# Patient Record
Sex: MALE | Race: BLACK OR AFRICAN AMERICAN | NOT HISPANIC OR LATINO | Employment: OTHER | URBAN - METROPOLITAN AREA
[De-identification: names, ages, dates, MRNs, and addresses within clinical notes are randomized per-mention and may not be internally consistent; named-entity substitution may affect disease eponyms.]

---

## 2017-12-02 ENCOUNTER — HOSPITAL ENCOUNTER (EMERGENCY)
Facility: HOSPITAL | Age: 32
Discharge: HOME | End: 2017-12-02
Attending: EMERGENCY MEDICINE

## 2017-12-02 VITALS
DIASTOLIC BLOOD PRESSURE: 90 MMHG | BODY MASS INDEX: 22.19 KG/M2 | SYSTOLIC BLOOD PRESSURE: 148 MMHG | OXYGEN SATURATION: 100 % | RESPIRATION RATE: 18 BRPM | HEIGHT: 70 IN | WEIGHT: 155 LBS | HEART RATE: 83 BPM

## 2017-12-02 DIAGNOSIS — K04.7 DENTAL ABSCESS: ICD-10-CM

## 2017-12-02 DIAGNOSIS — R56.9 SEIZURE: Primary | ICD-10-CM

## 2017-12-02 DIAGNOSIS — F19.10 DRUG ABUSE: ICD-10-CM

## 2017-12-02 LAB
ALBUMIN SERPL BCP-MCNC: 4.4 G/DL
ALP SERPL-CCNC: 93 U/L
ALT SERPL W/O P-5'-P-CCNC: 12 U/L
AMORPH CRY UR QL COMP ASSIST: NORMAL
AMPHET+METHAMPHET UR QL: NORMAL
ANION GAP SERPL CALC-SCNC: 19 MMOL/L
AST SERPL-CCNC: 25 U/L
BACTERIA #/AREA URNS AUTO: NORMAL /HPF
BARBITURATES UR QL SCN>200 NG/ML: NEGATIVE
BASOPHILS # BLD AUTO: 0.05 K/UL
BASOPHILS NFR BLD: 0.4 %
BENZODIAZ UR QL SCN>200 NG/ML: NORMAL
BILIRUB SERPL-MCNC: 0.3 MG/DL
BILIRUB UR QL STRIP: NEGATIVE
BUN SERPL-MCNC: 9 MG/DL
BZE UR QL SCN: NEGATIVE
CALCIUM SERPL-MCNC: 9.8 MG/DL
CANNABINOIDS UR QL SCN: NORMAL
CHLORIDE SERPL-SCNC: 100 MMOL/L
CLARITY UR REFRACT.AUTO: ABNORMAL
CO2 SERPL-SCNC: 20 MMOL/L
COLOR UR AUTO: YELLOW
CREAT SERPL-MCNC: 1.2 MG/DL
CREAT UR-MCNC: 38 MG/DL
DIFFERENTIAL METHOD: ABNORMAL
EOSINOPHIL # BLD AUTO: 0.1 K/UL
EOSINOPHIL NFR BLD: 0.5 %
ERYTHROCYTE [DISTWIDTH] IN BLOOD BY AUTOMATED COUNT: 13.1 %
EST. GFR  (AFRICAN AMERICAN): >60 ML/MIN/1.73 M^2
EST. GFR  (NON AFRICAN AMERICAN): >60 ML/MIN/1.73 M^2
ETHANOL SERPL-MCNC: <10 MG/DL
GLUCOSE SERPL-MCNC: 55 MG/DL
GLUCOSE UR QL STRIP: NEGATIVE
HCT VFR BLD AUTO: 40.9 %
HGB BLD-MCNC: 13.1 G/DL
HGB UR QL STRIP: ABNORMAL
HYALINE CASTS UR QL AUTO: 0 /LPF
IMM GRANULOCYTES # BLD AUTO: 0.04 K/UL
IMM GRANULOCYTES NFR BLD AUTO: 0.3 %
KETONES UR QL STRIP: NEGATIVE
LEUKOCYTE ESTERASE UR QL STRIP: NEGATIVE
LYMPHOCYTES # BLD AUTO: 2.4 K/UL
LYMPHOCYTES NFR BLD: 18.9 %
MAGNESIUM SERPL-MCNC: 2.6 MG/DL
MCH RBC QN AUTO: 28.8 PG
MCHC RBC AUTO-ENTMCNC: 32 G/DL
MCV RBC AUTO: 90 FL
METHADONE UR QL SCN>300 NG/ML: NEGATIVE
MICROSCOPIC COMMENT: NORMAL
MONOCYTES # BLD AUTO: 1.2 K/UL
MONOCYTES NFR BLD: 9.6 %
NEUTROPHILS # BLD AUTO: 9 K/UL
NEUTROPHILS NFR BLD: 70.3 %
NITRITE UR QL STRIP: NEGATIVE
NRBC BLD-RTO: 0 /100 WBC
OPIATES UR QL SCN: NEGATIVE
PCP UR QL SCN>25 NG/ML: NEGATIVE
PH UR STRIP: 6 [PH] (ref 5–8)
PLATELET # BLD AUTO: 264 K/UL
PMV BLD AUTO: 9.6 FL
POTASSIUM SERPL-SCNC: 3.6 MMOL/L
PROT SERPL-MCNC: 8.2 G/DL
PROT UR QL STRIP: ABNORMAL
RBC # BLD AUTO: 4.55 M/UL
RBC #/AREA URNS AUTO: 1 /HPF (ref 0–4)
SODIUM SERPL-SCNC: 139 MMOL/L
SP GR UR STRIP: 1.01 (ref 1–1.03)
TOXICOLOGY INFORMATION: NORMAL
URN SPEC COLLECT METH UR: ABNORMAL
UROBILINOGEN UR STRIP-ACNC: NEGATIVE EU/DL
WBC # BLD AUTO: 12.85 K/UL
WBC #/AREA URNS AUTO: 1 /HPF (ref 0–5)

## 2017-12-02 PROCEDURE — 80053 COMPREHEN METABOLIC PANEL: CPT

## 2017-12-02 PROCEDURE — 93010 ELECTROCARDIOGRAM REPORT: CPT | Mod: ,,, | Performed by: INTERNAL MEDICINE

## 2017-12-02 PROCEDURE — 99285 EMERGENCY DEPT VISIT HI MDM: CPT | Mod: 25

## 2017-12-02 PROCEDURE — 85025 COMPLETE CBC W/AUTO DIFF WBC: CPT

## 2017-12-02 PROCEDURE — 80307 DRUG TEST PRSMV CHEM ANLYZR: CPT

## 2017-12-02 PROCEDURE — 93005 ELECTROCARDIOGRAM TRACING: CPT

## 2017-12-02 PROCEDURE — 83735 ASSAY OF MAGNESIUM: CPT

## 2017-12-02 PROCEDURE — 99285 EMERGENCY DEPT VISIT HI MDM: CPT | Mod: ,,,

## 2017-12-02 PROCEDURE — 80320 DRUG SCREEN QUANTALCOHOLS: CPT

## 2017-12-02 PROCEDURE — 25000003 PHARM REV CODE 250

## 2017-12-02 PROCEDURE — 96365 THER/PROPH/DIAG IV INF INIT: CPT

## 2017-12-02 PROCEDURE — S0077 INJECTION, CLINDAMYCIN PHOSP: HCPCS

## 2017-12-02 PROCEDURE — 81001 URINALYSIS AUTO W/SCOPE: CPT

## 2017-12-02 RX ORDER — CLINDAMYCIN PHOSPHATE 900 MG/50ML
900 INJECTION, SOLUTION INTRAVENOUS
Status: COMPLETED | OUTPATIENT
Start: 2017-12-02 | End: 2017-12-02

## 2017-12-02 RX ORDER — KETOROLAC TROMETHAMINE 10 MG/1
10 TABLET, FILM COATED ORAL EVERY 6 HOURS
Qty: 20 TABLET | Refills: 0 | Status: ON HOLD | OUTPATIENT
Start: 2017-12-02 | End: 2018-12-19 | Stop reason: HOSPADM

## 2017-12-02 RX ORDER — CLINDAMYCIN HYDROCHLORIDE 150 MG/1
300 CAPSULE ORAL 4 TIMES DAILY
Qty: 56 CAPSULE | Refills: 0 | Status: SHIPPED | OUTPATIENT
Start: 2017-12-02 | End: 2017-12-09

## 2017-12-02 RX ADMIN — CLINDAMYCIN IN 5 PERCENT DEXTROSE 900 MG: 18 INJECTION, SOLUTION INTRAVENOUS at 10:12

## 2017-12-03 NOTE — ED NOTES
Charge nurse obtained cash from EMS that was on pt's person in the amount of $1230, put cash in envelope and escorted by security to secure cash. No other belongings obtained.

## 2017-12-03 NOTE — ED TRIAGE NOTES
"Ej Marte, a 31 y.o. male presents to the ED bed 2      Chief Complaint   Patient presents with    Drug Overdose     pt arrived by  EMS. pt was driving when he had a witnessed seizure. pt's family stopped the car. pt had a witnessed seizure in front of [place]. pt had what police called "green stuff" which they believe is MOJO in the car    Seizures     Pt became combative with EMS and they gave pt 5mg versed IV PTA. Pt lightly sedated.        Review of patient's allergies indicates:  Allergies not on file  No past medical history on file.  "

## 2017-12-03 NOTE — ED PROVIDER NOTES
"Encounter Date: 12/2/2017    SCRIBE #1 NOTE: I, Shabnam Phipps, am scribing for, and in the presence of,  Dr. Mehta. I have scribed the following portions of the note - the EKG reading.       History     Chief Complaint   Patient presents with    Drug Overdose     pt arrived by  EMS. pt was driving when he had a witnessed seizure. pt's family stopped the car. pt had a witnessed seizure in front of [facility]. pt had what police called "green stuff" which they believe is MOJO in the car    Seizures     31-year-old male with medical history of seizure presents to the ED with a seizure secondary to possible drug overdose.  Patient arrived via EMS.  EMS reports patient was driving when seizure began and his 10-year-old daughter pulled over the car.  Seizure lasted at least 2 minutes.  Police noted a green bag  in the car which is they believe to be mojo.  Patient's mother contacted and informed EMS that patient had a seizure one year ago that was possibly caused from hypoglycemia.  Patient was not placed on seizure medication.  EMS states patient became postictal and had bizarre behavior so he was given Versed 5mg in the field and has been sleeping since.  EMS reports patient was diaphoretic and possibly soiled himself. EMS denies signs of trauma to head and other areas of body.          Review of patient's allergies indicates:  Allergies not on file  History reviewed. No pertinent past medical history.  History reviewed. No pertinent surgical history.  History reviewed. No pertinent family history.  Social History   Substance Use Topics    Smoking status: Smoker, Current Status Unknown    Smokeless tobacco: Not on file    Alcohol use Not on file     Review of Systems   Constitutional: Negative for diaphoresis, fatigue and fever.   HENT: Positive for dental problem and facial swelling. Negative for congestion and sore throat.    Eyes: Negative for visual disturbance.   Respiratory: Negative for cough and shortness " of breath.    Cardiovascular: Negative for chest pain and leg swelling.   Gastrointestinal: Negative for abdominal distention, nausea and vomiting.   Genitourinary: Negative for dysuria, flank pain and hematuria.   Musculoskeletal: Negative for back pain and neck pain.   Skin: Negative for rash.   Neurological: Positive for seizures. Negative for syncope, weakness and headaches.   Psychiatric/Behavioral: The patient is not nervous/anxious.        Physical Exam     Initial Vitals [12/02/17 1956]   BP Pulse Resp Temp SpO2   (!) 142/74 110 16 -- 98 %      MAP       96.67         Physical Exam    Vitals reviewed.  Constitutional: Vital signs are normal. He appears well-developed and well-nourished. He is diaphoretic. He is sleeping. No distress.   Patient received versed 5mg in field and currently sleeping   HENT:   Head: Normocephalic and atraumatic.       Nose: Nose normal.   Mouth/Throat: Uvula is midline and oropharynx is clear and moist. Dental abscesses present.   Swelling noted to right lower jaw   Eyes: Conjunctivae and EOM are normal. Pupils are equal, round, and reactive to light. Right eye exhibits no discharge. Left eye exhibits no discharge. Pupils are equal.   Bilateral pinpoint pupils   Neck: Normal range of motion. Neck supple. No thyromegaly present.   Cardiovascular: Normal rate and regular rhythm.   Pulmonary/Chest: Breath sounds normal. No respiratory distress. He has no wheezes. He exhibits no tenderness.   Abdominal: Soft. Bowel sounds are normal. He exhibits no distension. There is no tenderness. There is no rebound.   Musculoskeletal: Normal range of motion.   Lymphadenopathy:     He has no cervical adenopathy.   Neurological: He is oriented to person, place, and time. He has normal strength. No cranial nerve deficit or sensory deficit.   Skin: Skin is warm. Capillary refill takes less than 2 seconds. No rash noted.   Psychiatric: He has a normal mood and affect.         ED Course    Procedures  Labs Reviewed   CBC W/ AUTO DIFFERENTIAL - Abnormal; Notable for the following:        Result Value    WBC 12.85 (*)     RBC 4.55 (*)     Hemoglobin 13.1 (*)     Gran # 9.0 (*)     Mono # 1.2 (*)     All other components within normal limits   COMPREHENSIVE METABOLIC PANEL - Abnormal; Notable for the following:     CO2 20 (*)     Glucose 55 (*)     Anion Gap 19 (*)     All other components within normal limits   URINALYSIS, REFLEX TO URINE CULTURE - Abnormal; Notable for the following:     Appearance, UA Hazy (*)     Protein, UA 1+ (*)     Occult Blood UA 1+ (*)     All other components within normal limits    Narrative:     Preferred Collection Type->Urine, Clean Catch   DRUG SCREEN PANEL, URINE EMERGENCY    Narrative:     Preferred Collection Type->Urine, Clean Catch   ALCOHOL,MEDICAL (ETHANOL)   MAGNESIUM   URINALYSIS MICROSCOPIC    Narrative:     Preferred Collection Type->Urine, Clean Catch     EKG Readings: (Independently Interpreted)   Heart rate 93. Normal sinus rhythm. LVH     Imaging Results          CT Head Without Contrast (Final result)  Result time 12/02/17 23:09:08    Final result by Benji Nelson MD (12/02/17 23:09:08)                 Impression:       Noncontrast CT demonstrates no evidence of acute intracranial pathology.  ______________________________________     Electronically signed by resident: ASHA BRADLEY  Date:     12/02/17  Time:    22:43            As the supervising and teaching physician, I personally reviewed the images and resident's interpretation and I agree with the findings.          Electronically signed by: BENJI NELSON MD  Date:     12/02/17  Time:    23:09              Narrative:    CT head without contrast    CLINICAL INDICATION: seizure    TECHNIQUE: Axial CT images obtained throughout the region of the head without the use of intravenous contrast. Axial, sagittal and coronal reconstructions were performed.    COMPARISON: No available dedicated  priors    FINDINGS:  The craniocervical junction is within normal limits.  The midline structures are unremarkable.  No dense vessel sign is identified.  The gray-white differentiation is maintained.  The ventricles are normal in size without evidence of hydrocephalus.The brain appears within normal limits. No parenchymal mass, hemorrhage, edema or major vascular distribution infarct.No extra-axial blood or fluid collections.    The cranium appears intact. The reported dental abscess is not imaged on this examination. There is a small metallic density, possibly a BB, adjacent to the extracranial aspect of the left superior posterior parietal bone Mastoid air cells and paranasal sinuses are essentially clear.                                 Medical Decision Making:   History:   Old Medical Records: I decided to obtain old medical records.  Old Records Summarized: records from clinic visits.  Independently Interpreted Test(s):   I have ordered and independently interpreted EKG Reading(s) - see prior notes  Clinical Tests:   Lab Tests: Ordered and Reviewed  Radiological Study: Ordered and Reviewed  Medical Tests: Ordered and Reviewed       APC / Resident Notes:   31-year-old male with medical history of seizure presents to the ED with a seizure secondary to possible drug overdose.    DDX includes but is not limited to seizure, drug overdose, etoh intoxication, electrolyte abnormality. Will get labs, EKG and await for patient to be clinically sober.    9:05 PM  Patient now AAOx3 and cooperative. Unaware of what happened, only complaint is dental abscess x 2 days and denies drug or alcohol use. Drug screen positives include benzodiazepines, amphetamines and marijuana. All other labs benign. Will give IV clinda 900mg for dental abscess. Patient is clinically sober. Able to ambulate on own.CT head benign. Discharged to home in stable condition, return to ED warnings given, follow up and patient care instructions  given.  Patient discharge home with clinda 300mg QID x 7 days.    I have discussed and reviewed with my supervising physician.       Scribe Attestation:   Scribe #1: I performed the above scribed service and the documentation accurately describes the services I performed. I attest to the accuracy of the note.    Attending Attestation:     Physician Attestation Statement for NP/PA:   I discussed this assessment and plan of this patient with the NP/PA, but I did not personally examine the patient. The face to face encounter was performed by the NP/PA.                  ED Course      Clinical Impression:   The primary encounter diagnosis was Seizure. Diagnoses of Drug abuse and Dental abscess were also pertinent to this visit.    Disposition:   Disposition: Discharged  Condition: Stable                        Lay Ceja PA-C  12/03/17 0154       Jesus Mehta MD  12/04/17 1249

## 2017-12-03 NOTE — ED TRIAGE NOTES
Pt brought in by EMS for a drug overdose, assumed to be MOJO, which he was reportedly smoking in the car while driving with his two children. Pt's daughter reportedly was able to get the car stopped, pt had a witnessed seizure lasting approximately 2 minutes. According to EMS pt was uncooperative and extremely diaphoretic, given 5mg of versed en route. Pt is currently still diaphoretic, only responsive to painful stimuli, will open his eyes but won't respond verbally. VSS, respirations even and unlabored, no distress noted.

## 2017-12-03 NOTE — ED NOTES
"Pt now awake, alert and oriented x 4, says he remembers driving his car earlier and "I felt a jerk" and then reports LOC. Next thing he remembers is police being everywhere, some of the ambulance ride. Pt denies smoking MOJO, reports all he does is smoke marijuana even when given drug screen results. Swelling noted to right side face, reports he began having tooth and mouth pain a few days ago. No distress noted. Physician informed of assessment. Family at bedside, will continue to monitor.   "

## 2017-12-03 NOTE — ED NOTES
Sealed envelope retrieved with security by charge nurse containing pt's cash in the amount of $1230 and returned to patient.

## 2018-12-14 ENCOUNTER — HOSPITAL ENCOUNTER (EMERGENCY)
Facility: HOSPITAL | Age: 33
Discharge: PSYCHIATRIC OR SUBSTANCE USE FACILITY | End: 2018-12-15
Attending: FAMILY MEDICINE
Payer: MEDICAID

## 2018-12-14 DIAGNOSIS — F20.9 SCHIZOPHRENIA, UNSPECIFIED TYPE: Primary | ICD-10-CM

## 2018-12-14 DIAGNOSIS — R94.31 ABNORMAL EKG: ICD-10-CM

## 2018-12-14 LAB
ALBUMIN SERPL BCP-MCNC: 5.5 G/DL
ALP SERPL-CCNC: 81 U/L
ALT SERPL W/O P-5'-P-CCNC: 16 U/L
AMPHET+METHAMPHET UR QL: NORMAL
ANION GAP SERPL CALC-SCNC: 8 MMOL/L
APAP SERPL-MCNC: <10 UG/ML
AST SERPL-CCNC: 27 U/L
BARBITURATES UR QL SCN>200 NG/ML: NEGATIVE
BASOPHILS # BLD AUTO: 0.05 K/UL
BASOPHILS NFR BLD: 0.8 %
BENZODIAZ UR QL SCN>200 NG/ML: NEGATIVE
BILIRUB SERPL-MCNC: 0.7 MG/DL
BILIRUB UR QL STRIP: NEGATIVE
BUN SERPL-MCNC: 13 MG/DL
BZE UR QL SCN: NEGATIVE
CALCIUM SERPL-MCNC: 10.5 MG/DL
CANNABINOIDS UR QL SCN: NORMAL
CHLORIDE SERPL-SCNC: 101 MMOL/L
CLARITY UR REFRACT.AUTO: CLEAR
CO2 SERPL-SCNC: 32 MMOL/L
COLOR UR AUTO: YELLOW
CREAT SERPL-MCNC: 1.1 MG/DL
CREAT UR-MCNC: 138.9 MG/DL
DIFFERENTIAL METHOD: NORMAL
EOSINOPHIL # BLD AUTO: 0.1 K/UL
EOSINOPHIL NFR BLD: 1.5 %
ERYTHROCYTE [DISTWIDTH] IN BLOOD BY AUTOMATED COUNT: 14.5 %
EST. GFR  (AFRICAN AMERICAN): >60 ML/MIN/1.73 M^2
EST. GFR  (NON AFRICAN AMERICAN): >60 ML/MIN/1.73 M^2
ETHANOL SERPL-MCNC: <10 MG/DL
GLUCOSE SERPL-MCNC: 81 MG/DL
GLUCOSE UR QL STRIP: NEGATIVE
HCT VFR BLD AUTO: 49.5 %
HGB BLD-MCNC: 16 G/DL
HGB UR QL STRIP: NEGATIVE
KETONES UR QL STRIP: NEGATIVE
LEUKOCYTE ESTERASE UR QL STRIP: NEGATIVE
LYMPHOCYTES # BLD AUTO: 2 K/UL
LYMPHOCYTES NFR BLD: 33.1 %
MCH RBC QN AUTO: 29.7 PG
MCHC RBC AUTO-ENTMCNC: 32.3 G/DL
MCV RBC AUTO: 92 FL
METHADONE UR QL SCN>300 NG/ML: NEGATIVE
MONOCYTES # BLD AUTO: 0.7 K/UL
MONOCYTES NFR BLD: 11.1 %
NEUTROPHILS # BLD AUTO: 3.2 K/UL
NEUTROPHILS NFR BLD: 53.3 %
NITRITE UR QL STRIP: NEGATIVE
OPIATES UR QL SCN: NEGATIVE
PCP UR QL SCN>25 NG/ML: NEGATIVE
PH UR STRIP: 7 [PH] (ref 5–8)
PLATELET # BLD AUTO: 276 K/UL
PMV BLD AUTO: 10.1 FL
POTASSIUM SERPL-SCNC: 4.2 MMOL/L
PROT SERPL-MCNC: 9.7 G/DL
PROT UR QL STRIP: NEGATIVE
RBC # BLD AUTO: 5.38 M/UL
SODIUM SERPL-SCNC: 141 MMOL/L
SP GR UR STRIP: 1 (ref 1–1.03)
TOXICOLOGY INFORMATION: NORMAL
URN SPEC COLLECT METH UR: NORMAL
UROBILINOGEN UR STRIP-ACNC: NEGATIVE EU/DL
WBC # BLD AUTO: 5.95 K/UL

## 2018-12-14 PROCEDURE — 85025 COMPLETE CBC W/AUTO DIFF WBC: CPT

## 2018-12-14 PROCEDURE — 80320 DRUG SCREEN QUANTALCOHOLS: CPT

## 2018-12-14 PROCEDURE — 81003 URINALYSIS AUTO W/O SCOPE: CPT | Mod: 59

## 2018-12-14 PROCEDURE — 80053 COMPREHEN METABOLIC PANEL: CPT

## 2018-12-14 PROCEDURE — 80307 DRUG TEST PRSMV CHEM ANLYZR: CPT

## 2018-12-14 PROCEDURE — 93005 ELECTROCARDIOGRAM TRACING: CPT

## 2018-12-14 PROCEDURE — 99285 EMERGENCY DEPT VISIT HI MDM: CPT | Mod: 25

## 2018-12-14 PROCEDURE — 80329 ANALGESICS NON-OPIOID 1 OR 2: CPT

## 2018-12-14 PROCEDURE — G0425 INPT/ED TELECONSULT30: HCPCS | Mod: AF,HB,, | Performed by: PSYCHIATRY & NEUROLOGY

## 2018-12-14 PROCEDURE — 93010 ELECTROCARDIOGRAM REPORT: CPT | Mod: ,,, | Performed by: INTERNAL MEDICINE

## 2018-12-14 NOTE — ED NOTES
Pt is resting in stretcher, in no acute distress, respirations even and non labored. PEC precautions maintained. Sitter at bedside for direct observation. Will continue to monitor.

## 2018-12-14 NOTE — ED NOTES
Called ED to notify that PEC was received; awaiting medical clearance to go forward with placement process.

## 2018-12-14 NOTE — ED NOTES
Tia from [facility] called, states Dr Salgado on call until 1200 but has been unable to contact him.  States Dr Jorge goes on call at 1300 and if she is unable to get in touch with Dr. Salgado will contact Dr Jorge at that point.  States no call from [phone].

## 2018-12-14 NOTE — ED NOTES
Waiting for tele pysch consult. Machine in room. Pt is calm/coopeartive. Sitter at bedside. Will continue to monitor.

## 2018-12-14 NOTE — ED NOTES
Spoke with Sunil at [facility] to inform him of packet faxed over for placement. Will call me back after review for intake placement.

## 2018-12-14 NOTE — ED NOTES
"Pt aware of need for urine sample, states "I know I am drinking now so I can get it". Will continue to monitor.   "

## 2018-12-14 NOTE — ED NOTES
Security [facility] with [facility].  Pt calm and cooperative at this time

## 2018-12-14 NOTE — ED NOTES
"Pt mother Tootie Doan called, states biswas was admitted to [facility] for approx 5 days last month.   pt has not taken any medications since being released.   pt has had feelings that people are following him and chasing him.  States "he can barely go to work, he lost his apartment because he thought people were following him."  Mother  he has started making statements like "I'm going to have to take them out before they get me."   pt came to her job and was pointing out some of her co-workers saying "they know something, they are following me."   pt was [place] interstate last pm and said "30-40" of the cars were following him.  Mother  "and his dad has a genetic brain disease, maybe this could be related to that, I don't know."  Mother  is coming to ED to bring pt insurance information.   "

## 2018-12-14 NOTE — CONSULTS
"Tele-Consultation to Emergency Department from Psychiatry    Please see previous notes:    From current presentation:  Patient presents with    Psychiatric Evaluation       Pt to ED per RICK Escalante with OPC in place.  States pt with paranoia, mother reports pt thinks people are out to kill him and he needs to protect self.  States pt was PEC last month and has not filled rx and not taking any medications for symptoms.    32-year-old male patient with a history of schizophrenia and bipolar comes in after being noncompliant on medicines feeling like several people are trying to kill him.  Patient has extensive paranoia and hallucinations otherwise patient was pec 1 month ago.  Patient was brought in by [place] police department    From 12/04/17:  Patient presents with    Drug Overdose       pt arrived by  EMS. pt was driving when he had a witnessed seizure. pt's family stopped the car. pt had a witnessed seizure in front of [facility]. pt had what police called "green stuff" which they believe is MOJO in the car    Seizures   31-year-old male with medical history of seizure presents to the ED with a seizure secondary to possible drug overdose.  Patient arrived via EMS.  EMS reports patient was driving when seizure began and his 10-year-old daughter pulled over the car.  Seizure lasted at least 2 minutes.  Police noted a green bag  in the car which is they believe to be mojo.  Patient's mother contacted and informed EMS that patient had a seizure one year ago that was possibly caused from hypoglycemia.  Patient was not placed on seizure medication.  EMS states patient became postictal and had bizarre behavior so he was given Versed 5mg in the field and has been sleeping since.  EMS reports patient was diaphoretic and possibly soiled himself. EMS denies signs of trauma to head and other areas of body.        Patient agreeable to consultation via telepsychiatry.    Consultation started: 12/14/2018 at 1:25 pm.  The " "chief complaint leading to psychiatric consultation is: OPC  This consultation was requested by Dr. Bright Steiner, the Emergency Department attending physician.  The location of the consulting psychiatrist is [address] [place] [address].  The patient location is [facility]    Patient Identification:  Ej Marte is a 32 y.o. male.    Patient information was obtained from patient.    History of Present Illness:  "The paper says 100 people are following me"; pt. Repeatedly says that this is not possible.  No medication. No alcohol. Smokes marijuana every few days. Occasionally takes pain pills. Denies mojo.    Pt. Agreeable to me calling mother, Dari, [phone]: about 5 weeks ago felt others were following him, trying to kill him; he ran and hid, jumped fences, reportedly was hiding [place] the [place] in the [place]; was hospitalized psychiatrically at [facility] for 5 days, pt. Subsequently may not have filled prescriptions, after 2 weeks became paranoid, saying that someone paid someone to kill, that people were following; pt.'s father reportedly has a genetic brain disease, father's mother had same illness; pt. Has had a few seizures, most recent about 9 months ago; pt. Recently may have used ecstasy or methamphetamine; mother about 9 months ago was told that pt. Had a small bleed in his brain after having a seizure and falling; yesterday pt. Told mother that he might have to "take out" the people who were trying to have him killed; pt. Has been trying to obtain a gun; nkda    Psychiatric History:   States, that about 6 weeks ago, his girlfriend and her friends were following him, and he saw a psychiatrist at the time.  Hospitalization: denies  Medication Trials: denies  Suicide Attempts: denies  Violence: denies  Depression: denies  Sarah: denies  AH's: denies  Delusions: reportedly has been paranoid    Review of Systems:  Denies any current physical complaint    Past Medical History: " "History reviewed. No pertinent past medical history.     Seizures: yes  Head trauma/l.o.c.: denies head trauma with l.o.c.    Allergies: nkda  Review of patient's allergies indicates:  No Known Allergies    Medications in ER: Medications - No data to display    Legal History:   Past charges/incarcerations: in past incarcerated for a week[traffic tickets]  Pending charges: denies    Family Psychiatric History:   denies    Social History:   History of Physical/Sexual Abuse: denies  Education: 7th grade    Employment/Disability: has [employer]   Financial: adequate resources  Relationship Status/Sexual Orientation: currently not in a relationship   Children: 6   Housing Status: has recently been staying at different places  Restoration: believes in God   History: denies   Recreational Activities: fishing  Access to Gun: denies     Current Evaluation:     Constitutional  Vitals:  Vitals:    12/14/18 0842   BP: (!) 164/104   Pulse: (!) 115   Resp: 20   Temp: 98.5 °F (36.9 °C)   TempSrc: Oral   SpO2: 100%   Weight: 74.8 kg (165 lb)   Height: 6' (1.829 m)      General:  unremarkable, age appropriate     Musculoskeletal  Muscle Strength/Tone:   moving arms normally   Gait & Station:   sitting on stretcher     Psychiatric  Level of Consciousness: alert  Orientation: oriented to person, place and time  Grooming: in hospital gown  Psychomotor Behavior: no agitation  Speech: normal in rate, rhythm and volume  Language: uses words appropriately  Mood: "ok"  Affect: full range  Thought Process: repeatedly focuses on reportedly 100 people following him[says that this is not possible]  Thought Content: denies SI/HI  Memory: grossly intact  Attention: intact to interview  Insight: appears limited  Judgement: appears limited    Relevant Elements of Neurological Exam: no abnormality of posture noted    Assessment - Diagnosis - Goals:     Diagnosis/Impression:   Psychosis, unspecified  R/o substance induced psychosis  R/o psychosis " due to neurological disease[father has and paternal grandmother had serious neurological disease; pt. Has had seizures; 9 months ago mother was told that pt. Had a small bleed in his brain]  Urine tox positive for THC  Abnormal EKG from 12/02/17[please see below]    Case d/w ER MD Dr. Steiner.    Rec:    - consider repeating EKG  - consider imaging of brain  - consider neurology consult  - medical clearance  - PEC   - if medically/neurologically cleared, psychiatric hospitalization  - no standing psychotropic medication for now  - Haldol/Benadryl/Ativan p.o./i.m. Prn for agitation      Time with patient: 15 min    More than 50% of the time was spent counseling/coordinating care    Laboratory Data:   EKG from 12/02/17:  Blood Pressure : 134/076 mmHG  Vent. Rate : 093 BPM     Atrial Rate : 093 BPM     P-R Int : 140 ms          QRS Dur : 098 ms      QT Int : 388 ms       P-R-T Axes : 089 099 065 degrees     QTc Int : 482 ms  Normal sinus rhythm  Biatrial enlargement  Rightward axis  Prolonged QT  Abnormal ECG  No previous ECGs available  Confirmed by TOMAS GAMBOA MD ([id number]) on 12/3/2017 5:28:51 PM  Labs Reviewed   COMPREHENSIVE METABOLIC PANEL - Abnormal; Notable for the following components:       Result Value    CO2 32 (*)     Total Protein 9.7 (*)     Albumin 5.5 (*)     All other components within normal limits   CBC W/ AUTO DIFFERENTIAL   URINALYSIS, REFLEX TO URINE CULTURE    Narrative:     Preferred Collection Type->Urine, Clean Catch   DRUG SCREEN PANEL, URINE EMERGENCY    Narrative:     Preferred Collection Type->Urine, Clean Catch   ALCOHOL,MEDICAL (ETHANOL)   ACETAMINOPHEN LEVEL

## 2018-12-14 NOTE — ED PROVIDER NOTES
Encounter Date: 12/14/2018       History     Chief Complaint   Patient presents with    Psychiatric Evaluation     Pt to ED per RICK Escalante with OPC in place.  States pt with paranoia, mother reports pt thinks people are out to kill him and he needs to protect self.  States pt was PEC last month and has not filled rx and not taking any medications for symptoms.      32-year-old male patient with a history of schizophrenia and bipolar comes in after being noncompliant on medicines feeling like several people are trying to kill him.  Patient has extensive paranoia and hallucinations otherwise patient was pec 1 month ago.  Patient was brought in by [place] police department          Review of patient's allergies indicates:  No Known Allergies  History reviewed. No pertinent past medical history.  History reviewed. No pertinent surgical history.  History reviewed. No pertinent family history.  Social History     Tobacco Use    Smoking status: Smoker, Current Status Unknown   Substance Use Topics    Alcohol use: Not on file    Drug use: Yes     Comment: MOJO, other drugs unknown     Review of Systems   Unable to perform ROS: Psychiatric disorder       Physical Exam     Initial Vitals [12/14/18 0842]   BP Pulse Resp Temp SpO2   (!) 164/104 (!) 115 20 98.5 °F (36.9 °C) 100 %      MAP       --         Physical Exam    Nursing note and vitals reviewed.  Constitutional: He appears well-developed and well-nourished.   HENT:   Head: Normocephalic and atraumatic.   Eyes: Conjunctivae and EOM are normal. Pupils are equal, round, and reactive to light.   Neck: Normal range of motion. Neck supple.   Cardiovascular: Normal rate, regular rhythm and normal heart sounds.   Pulmonary/Chest: Breath sounds normal.   Abdominal: Soft. Bowel sounds are normal.   Musculoskeletal: Normal range of motion.   Neurological: He is alert. He has normal reflexes.   Psychiatric:   Pressured speech with hallucinations and somewhat paranoid          ED Course   Procedures  Labs Reviewed   CBC W/ AUTO DIFFERENTIAL   COMPREHENSIVE METABOLIC PANEL   URINALYSIS, REFLEX TO URINE CULTURE   DRUG SCREEN PANEL, URINE EMERGENCY   ALCOHOL,MEDICAL (ETHANOL)   ACETAMINOPHEN LEVEL          Imaging Results    None          Medical Decision Making:   Initial Assessment:   Patient in moderate distress and no other complains    Differential Diagnosis:   Suicide ideation  Schizophrenia  Depression  anxiety    ED Management:  Patient is medically cleared for inpatient psychiatric treatment                      Clinical Impression:   The encounter diagnosis was Schizophrenia, unspecified type.                             Bright Steiner MD  12/14/18 [id number]

## 2018-12-15 VITALS
WEIGHT: 165 LBS | OXYGEN SATURATION: 99 % | TEMPERATURE: 98 F | DIASTOLIC BLOOD PRESSURE: 55 MMHG | RESPIRATION RATE: 18 BRPM | HEART RATE: 71 BPM | BODY MASS INDEX: 22.35 KG/M2 | SYSTOLIC BLOOD PRESSURE: 112 MMHG | HEIGHT: 72 IN

## 2018-12-15 PROBLEM — F29 PSYCHOSIS: Status: ACTIVE | Noted: 2018-12-15

## 2018-12-15 NOTE — ED NOTES
Late Addendum: Patient medically cleared for admission per Dr. Steiner at 1530. EKG faxed to [facility] per request.

## 2018-12-15 NOTE — ED NOTES
Transport arrived to  patient. VSS, and belongings given to bar. Report on patient given and [facility] made aware of patient's status.

## 2018-12-15 NOTE — ED NOTES
Admit packet faxed to [facility], [facility], [facility], [facility], [facility], [facility], [facility], [facility], [facility], , [facility], [facility], [facility], [facility], [facility], [facility], [facility], [facility], [facility], [facility], [facility], [facility], [facility], [facility], [facility], [facility], [facility],and [facility].  Waiting for response.

## 2018-12-15 NOTE — ED NOTES
CPT placement accepted by Leti at [facility] located at [address] [place] [address] for the service of Dr. Bhavin Valdivia. Please call report in 30 minutes to [phone].

## 2019-01-01 ENCOUNTER — HOSPITAL ENCOUNTER (EMERGENCY)
Facility: HOSPITAL | Age: 34
Discharge: HOME | End: 2019-01-01
Attending: SURGERY
Payer: MEDICAID

## 2019-01-01 VITALS
OXYGEN SATURATION: 100 % | DIASTOLIC BLOOD PRESSURE: 83 MMHG | RESPIRATION RATE: 19 BRPM | TEMPERATURE: 98 F | SYSTOLIC BLOOD PRESSURE: 163 MMHG | HEART RATE: 87 BPM

## 2019-01-01 DIAGNOSIS — F23: Primary | ICD-10-CM

## 2019-01-01 PROCEDURE — 99283 EMERGENCY DEPT VISIT LOW MDM: CPT | Mod: ER

## 2019-01-01 NOTE — DISCHARGE INSTRUCTIONS
Taking medications as recommended which include risperdal and follow-up with mental health counseling
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
## 2019-01-01 NOTE — ED NOTES
Pt reports every time I get mad at my mom they call the  I just got to be calm. Pt AAO times 4. Pt ambulates steady and without difficulty. Pt calm cooperative and plesant. MD okayed pt to leave ED and walk.

## 2019-01-02 ENCOUNTER — HOSPITAL ENCOUNTER (EMERGENCY)
Facility: HOSPITAL | Age: 34
Discharge: HOME | End: 2019-01-02
Attending: EMERGENCY MEDICINE
Payer: MEDICAID

## 2019-01-02 VITALS
TEMPERATURE: 98 F | BODY MASS INDEX: 23.7 KG/M2 | SYSTOLIC BLOOD PRESSURE: 141 MMHG | WEIGHT: 175 LBS | HEART RATE: 88 BPM | RESPIRATION RATE: 18 BRPM | OXYGEN SATURATION: 99 % | DIASTOLIC BLOOD PRESSURE: 79 MMHG | HEIGHT: 72 IN

## 2019-01-02 DIAGNOSIS — F29 PSYCHOSIS, UNSPECIFIED PSYCHOSIS TYPE: Primary | ICD-10-CM

## 2019-01-02 PROCEDURE — 99283 EMERGENCY DEPT VISIT LOW MDM: CPT | Mod: ER

## 2019-01-03 ENCOUNTER — HOSPITAL ENCOUNTER (EMERGENCY)
Facility: HOSPITAL | Age: 34
Discharge: PSYCHIATRIC OR SUBSTANCE USE FACILITY | End: 2019-01-03
Attending: EMERGENCY MEDICINE
Payer: MEDICAID

## 2019-01-03 VITALS
SYSTOLIC BLOOD PRESSURE: 138 MMHG | DIASTOLIC BLOOD PRESSURE: 66 MMHG | TEMPERATURE: 98 F | OXYGEN SATURATION: 98 % | RESPIRATION RATE: 20 BRPM | HEART RATE: 90 BPM

## 2019-01-03 DIAGNOSIS — F23 ACUTE PSYCHOSIS: Primary | ICD-10-CM

## 2019-01-03 DIAGNOSIS — F20.0 PARANOID SCHIZOPHRENIA: ICD-10-CM

## 2019-01-03 LAB
ALBUMIN SERPL BCP-MCNC: 4.2 G/DL
ALP SERPL-CCNC: 65 U/L
ALT SERPL W/O P-5'-P-CCNC: 12 U/L
AMPHET+METHAMPHET UR QL: NORMAL
ANION GAP SERPL CALC-SCNC: 8 MMOL/L
APAP SERPL-MCNC: <10 UG/ML
AST SERPL-CCNC: 21 U/L
BARBITURATES UR QL SCN>200 NG/ML: NEGATIVE
BASOPHILS # BLD AUTO: 0.03 K/UL
BASOPHILS NFR BLD: 0.5 %
BENZODIAZ UR QL SCN>200 NG/ML: NEGATIVE
BILIRUB SERPL-MCNC: 0.4 MG/DL
BILIRUB UR QL STRIP: NEGATIVE
BUN SERPL-MCNC: 9 MG/DL
BZE UR QL SCN: NEGATIVE
CALCIUM SERPL-MCNC: 9.4 MG/DL
CANNABINOIDS UR QL SCN: NEGATIVE
CHLORIDE SERPL-SCNC: 103 MMOL/L
CLARITY UR REFRACT.AUTO: CLEAR
CO2 SERPL-SCNC: 27 MMOL/L
COLOR UR AUTO: YELLOW
CREAT SERPL-MCNC: 0.93 MG/DL
CREAT UR-MCNC: 53.3 MG/DL
DIFFERENTIAL METHOD: NORMAL
EOSINOPHIL # BLD AUTO: 0.1 K/UL
EOSINOPHIL NFR BLD: 1.5 %
ERYTHROCYTE [DISTWIDTH] IN BLOOD BY AUTOMATED COUNT: 13.4 %
EST. GFR  (AFRICAN AMERICAN): >60 ML/MIN/1.73 M^2
EST. GFR  (NON AFRICAN AMERICAN): >60 ML/MIN/1.73 M^2
ETHANOL SERPL-MCNC: <10 MG/DL
GLUCOSE SERPL-MCNC: 95 MG/DL
GLUCOSE UR QL STRIP: NEGATIVE
HCT VFR BLD AUTO: 43.3 %
HGB BLD-MCNC: 14.1 G/DL
HGB UR QL STRIP: NEGATIVE
KETONES UR QL STRIP: NEGATIVE
LEUKOCYTE ESTERASE UR QL STRIP: NEGATIVE
LYMPHOCYTES # BLD AUTO: 1.6 K/UL
LYMPHOCYTES NFR BLD: 26.4 %
MCH RBC QN AUTO: 29.8 PG
MCHC RBC AUTO-ENTMCNC: 32.6 G/DL
MCV RBC AUTO: 92 FL
METHADONE UR QL SCN>300 NG/ML: NEGATIVE
MONOCYTES # BLD AUTO: 0.6 K/UL
MONOCYTES NFR BLD: 9.1 %
NEUTROPHILS # BLD AUTO: 3.8 K/UL
NEUTROPHILS NFR BLD: 62.3 %
NITRITE UR QL STRIP: NEGATIVE
OPIATES UR QL SCN: NEGATIVE
PCP UR QL SCN>25 NG/ML: NEGATIVE
PH UR STRIP: 7 [PH] (ref 5–8)
PLATELET # BLD AUTO: 245 K/UL
PMV BLD AUTO: 9.9 FL
POTASSIUM SERPL-SCNC: 3.9 MMOL/L
PROT SERPL-MCNC: 7.2 G/DL
PROT UR QL STRIP: NEGATIVE
RBC # BLD AUTO: 4.73 M/UL
SODIUM SERPL-SCNC: 138 MMOL/L
SP GR UR STRIP: 1 (ref 1–1.03)
TOXICOLOGY INFORMATION: NORMAL
URN SPEC COLLECT METH UR: NORMAL
UROBILINOGEN UR STRIP-ACNC: NEGATIVE EU/DL
WBC # BLD AUTO: 6.05 K/UL

## 2019-01-03 PROCEDURE — 80307 DRUG TEST PRSMV CHEM ANLYZR: CPT | Mod: ER

## 2019-01-03 PROCEDURE — 99285 EMERGENCY DEPT VISIT HI MDM: CPT | Mod: ER

## 2019-01-03 PROCEDURE — 85025 COMPLETE CBC W/AUTO DIFF WBC: CPT | Mod: ER

## 2019-01-03 PROCEDURE — G0426 PR INPT TELEHEALTH CONSULT 50M: ICD-10-PCS | Mod: AF,HB,, | Performed by: PSYCHIATRY & NEUROLOGY

## 2019-01-03 PROCEDURE — G0426 INPT/ED TELECONSULT50: HCPCS | Mod: AF,HB,, | Performed by: PSYCHIATRY & NEUROLOGY

## 2019-01-03 PROCEDURE — 80053 COMPREHEN METABOLIC PANEL: CPT | Mod: ER

## 2019-01-03 PROCEDURE — 81003 URINALYSIS AUTO W/O SCOPE: CPT | Mod: ER,59

## 2019-01-03 PROCEDURE — 80329 ANALGESICS NON-OPIOID 1 OR 2: CPT | Mod: ER

## 2019-01-03 PROCEDURE — 80320 DRUG SCREEN QUANTALCOHOLS: CPT | Mod: ER

## 2019-01-03 NOTE — ED NOTES
Pt brought in per police on an OPC. Pt awake and alert. Pt calm and cooperative. Pt reports he is not having visual or auditory hallucinations. Pt denies SI/HI. Pt awaiting MD brown. Sitter at the bedside, bed low and locked, Sr up x1, will ctm.
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
## 2019-01-03 NOTE — ED NOTES
Dr. Lipscomb called requesting nurse or charge nurse listen to a recording pt mother has on him saying psychic things. The return a call to her to discuss.

## 2019-01-03 NOTE — ED NOTES
"Pt has repeatedly refused lab draw and urine specimen. "I did that already and the psychiatrist let me go. I read the law, y'all can't keep doin' this". He is remaining calm, but insists he will not give blood/urine   "

## 2019-01-03 NOTE — ED NOTES
Called [facility] again to ask about delay in tele psych, and they will be calling Dr Lipscomb again. Spoke with Gege
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
## 2019-01-03 NOTE — ED NOTES
Belongings  Black and neon yellow high tops athletic shoes/Nike  Relic watch / silver color with black rubber watch band  White cell phone with multiple cracks and missing pieces  Black long sleeve hoodie sweat shirt  Black sweat pants  Bagged in one bag/labeled and placed in locker

## 2019-01-03 NOTE — ED NOTES
Tele psych consult requested . Spoke with Jocleyn and she is training Makayla. Will call Dr Lipscomb

## 2019-01-03 NOTE — ED NOTES
Still waiting for MD to fill out the PEC. Will fax to [facility] as soon as it is provided to me
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
## 2019-01-04 PROBLEM — R56.9 SEIZURE: Status: ACTIVE | Noted: 2019-01-04

## 2019-01-04 NOTE — ED NOTES
Patient accepted by Gail at [facility] ([address] [place] [address]) for the service of Dr. Valdivia.  Report to be called to [phone].

## 2019-01-04 NOTE — ED NOTES
PEC received in CPT. Faxed clinical packet to [facility], [facility], [facility], & [facility]. Awaiting placement.

## 2019-01-04 NOTE — ED NOTES
Pt resting on stretcher, nadn, resp e/u. Pt calm and cooperative. Pt provided with something to eat and drink. Pt voiced no additional needs at this time. Miri Colindres at pt bedside.

## 2019-01-05 PROBLEM — F20.89 OTHER SCHIZOPHRENIA: Chronic | Status: ACTIVE | Noted: 2018-12-15

## 2019-08-21 ENCOUNTER — HOSPITAL ENCOUNTER (EMERGENCY)
Facility: HOSPITAL | Age: 34
Discharge: PSYCHIATRIC OR SUBSTANCE USE FACILITY | End: 2019-08-22
Attending: EMERGENCY MEDICINE
Payer: MEDICAID

## 2019-08-21 DIAGNOSIS — F29 ACUTE EXACERBATION OF PSYCHOSIS: Primary | ICD-10-CM

## 2019-08-21 DIAGNOSIS — F20.0 PARANOID SCHIZOPHRENIA: ICD-10-CM

## 2019-08-21 LAB
ALBUMIN SERPL BCP-MCNC: 4.6 G/DL (ref 3.5–5.2)
ALP SERPL-CCNC: 89 U/L (ref 38–126)
ALT SERPL W/O P-5'-P-CCNC: 12 U/L (ref 10–44)
ANION GAP SERPL CALC-SCNC: 6 MMOL/L (ref 8–16)
APAP SERPL-MCNC: <10 UG/ML (ref 10–20)
AST SERPL-CCNC: 22 U/L (ref 15–46)
BASOPHILS # BLD AUTO: 0.05 K/UL (ref 0–0.2)
BASOPHILS NFR BLD: 0.8 % (ref 0–1.9)
BILIRUB SERPL-MCNC: 0.8 MG/DL (ref 0.1–1)
BUN SERPL-MCNC: 10 MG/DL (ref 2–20)
CALCIUM SERPL-MCNC: 9.6 MG/DL (ref 8.7–10.5)
CHLORIDE SERPL-SCNC: 107 MMOL/L (ref 95–110)
CO2 SERPL-SCNC: 29 MMOL/L (ref 23–29)
CREAT SERPL-MCNC: 1.01 MG/DL (ref 0.5–1.4)
DIFFERENTIAL METHOD: NORMAL
EOSINOPHIL # BLD AUTO: 0.1 K/UL (ref 0–0.5)
EOSINOPHIL NFR BLD: 1.1 % (ref 0–8)
ERYTHROCYTE [DISTWIDTH] IN BLOOD BY AUTOMATED COUNT: 14.3 % (ref 11.5–14.5)
EST. GFR  (AFRICAN AMERICAN): >60 ML/MIN/1.73 M^2
EST. GFR  (NON AFRICAN AMERICAN): >60 ML/MIN/1.73 M^2
ETHANOL SERPL-MCNC: <10 MG/DL
GLUCOSE SERPL-MCNC: 90 MG/DL (ref 70–110)
HCT VFR BLD AUTO: 44.9 % (ref 40–54)
HGB BLD-MCNC: 14.7 G/DL (ref 14–18)
LYMPHOCYTES # BLD AUTO: 2.5 K/UL (ref 1–4.8)
LYMPHOCYTES NFR BLD: 38.7 % (ref 18–48)
MCH RBC QN AUTO: 29.9 PG (ref 27–31)
MCHC RBC AUTO-ENTMCNC: 32.7 G/DL (ref 32–36)
MCV RBC AUTO: 91 FL (ref 82–98)
MONOCYTES # BLD AUTO: 0.6 K/UL (ref 0.3–1)
MONOCYTES NFR BLD: 9.6 % (ref 4–15)
NEUTROPHILS # BLD AUTO: 3.2 K/UL (ref 1.8–7.7)
NEUTROPHILS NFR BLD: 49.8 % (ref 38–73)
PLATELET # BLD AUTO: 259 K/UL (ref 150–350)
PMV BLD AUTO: 10.1 FL (ref 9.2–12.9)
POTASSIUM SERPL-SCNC: 3.5 MMOL/L (ref 3.5–5.1)
PROT SERPL-MCNC: 7.8 G/DL (ref 6–8.4)
RBC # BLD AUTO: 4.91 M/UL (ref 4.6–6.2)
SODIUM SERPL-SCNC: 142 MMOL/L (ref 136–145)
WBC # BLD AUTO: 6.46 K/UL (ref 3.9–12.7)

## 2019-08-21 PROCEDURE — 99215 OFFICE O/P EST HI 40 MIN: CPT | Mod: 95,AF,HB, | Performed by: PSYCHIATRY & NEUROLOGY

## 2019-08-21 PROCEDURE — 99285 EMERGENCY DEPT VISIT HI MDM: CPT | Mod: 25,ER

## 2019-08-21 PROCEDURE — 99215 PR OFFICE/OUTPT VISIT, EST, LEVL V, 40-54 MIN: ICD-10-PCS | Mod: 95,AF,HB, | Performed by: PSYCHIATRY & NEUROLOGY

## 2019-08-21 PROCEDURE — 85025 COMPLETE CBC W/AUTO DIFF WBC: CPT | Mod: ER

## 2019-08-21 PROCEDURE — 80329 ANALGESICS NON-OPIOID 1 OR 2: CPT | Mod: ER

## 2019-08-21 PROCEDURE — 80320 DRUG SCREEN QUANTALCOHOLS: CPT | Mod: ER

## 2019-08-21 PROCEDURE — 80053 COMPREHEN METABOLIC PANEL: CPT | Mod: ER

## 2019-08-21 PROCEDURE — 63600175 PHARM REV CODE 636 W HCPCS: Mod: ER | Performed by: EMERGENCY MEDICINE

## 2019-08-21 PROCEDURE — 96372 THER/PROPH/DIAG INJ SC/IM: CPT | Mod: ER

## 2019-08-21 RX ORDER — BENZTROPINE MESYLATE 1 MG/1
1 TABLET ORAL EVERY 6 HOURS PRN
Qty: 10 TABLET | Refills: 0 | Status: SHIPPED | OUTPATIENT
Start: 2019-08-21 | End: 2022-08-24 | Stop reason: CLARIF

## 2019-08-21 RX ORDER — DIPHENHYDRAMINE HYDROCHLORIDE 50 MG/ML
50 INJECTION INTRAMUSCULAR; INTRAVENOUS
Status: COMPLETED | OUTPATIENT
Start: 2019-08-21 | End: 2019-08-21

## 2019-08-21 RX ORDER — HALOPERIDOL 5 MG/ML
5 INJECTION INTRAMUSCULAR
Status: COMPLETED | OUTPATIENT
Start: 2019-08-21 | End: 2019-08-21

## 2019-08-21 RX ORDER — RISPERIDONE 3 MG/1
3 TABLET ORAL 2 TIMES DAILY
Qty: 60 TABLET | Refills: 0 | Status: SHIPPED | OUTPATIENT
Start: 2019-08-21 | End: 2023-06-20

## 2019-08-21 RX ORDER — QUETIAPINE FUMARATE 100 MG/1
100 TABLET, FILM COATED ORAL NIGHTLY
Qty: 30 TABLET | Refills: 0 | Status: SHIPPED | OUTPATIENT
Start: 2019-08-21 | End: 2022-07-10 | Stop reason: SDUPTHER

## 2019-08-21 RX ADMIN — DIPHENHYDRAMINE HYDROCHLORIDE 50 MG: 50 INJECTION INTRAMUSCULAR; INTRAVENOUS at 07:08

## 2019-08-21 RX ADMIN — HALOPERIDOL LACTATE 5 MG: 5 INJECTION, SOLUTION INTRAMUSCULAR at 07:08

## 2019-08-22 VITALS
HEART RATE: 100 BPM | TEMPERATURE: 98 F | SYSTOLIC BLOOD PRESSURE: 127 MMHG | HEIGHT: 72 IN | OXYGEN SATURATION: 100 % | DIASTOLIC BLOOD PRESSURE: 79 MMHG | RESPIRATION RATE: 20 BRPM | BODY MASS INDEX: 23.7 KG/M2 | WEIGHT: 175 LBS

## 2019-08-22 LAB
AMPHET+METHAMPHET UR QL: NORMAL
BACTERIA #/AREA URNS AUTO: ABNORMAL /HPF
BARBITURATES UR QL SCN>200 NG/ML: NEGATIVE
BENZODIAZ UR QL SCN>200 NG/ML: NEGATIVE
BILIRUB UR QL STRIP: NEGATIVE
BZE UR QL SCN: NEGATIVE
CANNABINOIDS UR QL SCN: NEGATIVE
CLARITY UR REFRACT.AUTO: CLEAR
COLOR UR AUTO: ABNORMAL
CREAT UR-MCNC: >346.5 MG/DL (ref 23–375)
GLUCOSE UR QL STRIP: NEGATIVE
HGB UR QL STRIP: NEGATIVE
HYALINE CASTS UR QL AUTO: 0 /LPF
KETONES UR QL STRIP: ABNORMAL
LEUKOCYTE ESTERASE UR QL STRIP: ABNORMAL
METHADONE UR QL SCN>300 NG/ML: NEGATIVE
MICROSCOPIC COMMENT: ABNORMAL
NITRITE UR QL STRIP: NEGATIVE
OPIATES UR QL SCN: NEGATIVE
PCP UR QL SCN>25 NG/ML: NEGATIVE
PH UR STRIP: 7 [PH] (ref 5–8)
PROT UR QL STRIP: ABNORMAL
RBC #/AREA URNS AUTO: 6 /HPF (ref 0–4)
SP GR UR STRIP: 1.02 (ref 1–1.03)
SQUAMOUS #/AREA URNS AUTO: ABNORMAL /HPF
TOXICOLOGY INFORMATION: NORMAL
URN SPEC COLLECT METH UR: ABNORMAL
UROBILINOGEN UR STRIP-ACNC: >=8 EU/DL
WBC #/AREA URNS AUTO: 60 /HPF (ref 0–5)
YEAST UR QL AUTO: ABNORMAL

## 2019-08-22 PROCEDURE — 80307 DRUG TEST PRSMV CHEM ANLYZR: CPT | Mod: ER

## 2019-08-22 PROCEDURE — 81000 URINALYSIS NONAUTO W/SCOPE: CPT | Mod: 59,ER

## 2019-08-22 PROCEDURE — 87491 CHLMYD TRACH DNA AMP PROBE: CPT | Mod: ER

## 2019-08-22 PROCEDURE — 87086 URINE CULTURE/COLONY COUNT: CPT | Mod: ER

## 2019-08-22 NOTE — ED NOTES
Dr Ballesteros spoke with pt. Speaking with Dr. Farooq now and does not recommend PEC just to get pt scripts for meds. Informed Dr. Ballesteros that the pt would like her to speak with his mother. Dr. Ballesteros states she does not feel this is necessary.

## 2019-08-22 NOTE — ED NOTES
[facility] called spoke with Marisol. Asked Marisol to please page Dr. Ballesteros so she can speak with pt's mother. Marisol paged Dr. Ballesteros at this time.

## 2019-08-22 NOTE — ED NOTES
Pts PEC faxed to [facility].  's office contacted to inform of PEC status.

## 2019-08-22 NOTE — ED NOTES
Pt unable to urinate and was instructed to drink more of his Powerade. Will try again in 15 minutes.

## 2019-08-22 NOTE — ED NOTES
Dr. Farooq in room to speak with mom at this time. Informed mother that Dr. Ballesteros does not feel pt is in a condition to be PEC'd that she recommends to prescribe his psych meds and start his normal regimen. Mother became frustrated while speaking with Dr. Farooq and left the room. Approached mother down the hallway at which time she agreed to wait and speak with Dr. Ballesteros/psychiatry.

## 2019-08-22 NOTE — ED NOTES
Mother informed on where patient was transferred. [facility] phone information given to mother.

## 2019-08-22 NOTE — ED NOTES
Dr. Ballesteros called back, informed Dr. Ballesteros of what mother has informed myself and Dr. Farooq, and that the mother would like to speak with her. Dr. Ballesteros states she needs to review her notes and Dr. Farooq notes and then she will talk to the mother.

## 2019-08-22 NOTE — NURSING
-Kay states pt's mom currently at facility, asking if Dr Ballesteros could speak with pts mom. I called and left  for Dr Ballesteros.

## 2019-08-22 NOTE — ED PROVIDER NOTES
"Encounter Date: 8/21/2019       History     Chief Complaint   Patient presents with    Psychiatric Evaluation     "I havent been taking my medicines. I need to be admitted." Pt reports that he is "talking out of my head and seeing things following me." +Visual hallucinations. Denies SI/HI.     33-year-old male presents complaining of having a history of schizophrenia and not taking his medicines for over a month.  Patient reports he is very paranoid and feels people are following him and talking about him.  Patient is to paranoid to show up at his own home.  Patient is a parade people are waiting for him to get him.  Patient denies suicidal homicidal ideation.        Review of patient's allergies indicates:  No Known Allergies  Past Medical History:   Diagnosis Date    Bipolar disorder     History of psychiatric hospitalization     Hx of psychiatric care     Psychiatric exam requested by authority     Psychiatric problem     Psychosis     Schizoaffective disorder     Seizures     Substance abuse     Therapy      History reviewed. No pertinent surgical history.  History reviewed. No pertinent family history.  Social History     Tobacco Use    Smoking status: Current Every Day Smoker     Types: Cigarettes    Smokeless tobacco: Never Used   Substance Use Topics    Alcohol use: No     Frequency: Never    Drug use: Yes     Types: Marijuana     Comment: MOJO, other drugs unknown     Review of Systems   Psychiatric/Behavioral: Positive for hallucinations.   All other systems reviewed and are negative.      Physical Exam     Initial Vitals [08/21/19 1911]   BP Pulse Resp Temp SpO2   (!) 160/97 78 20 98.1 °F (36.7 °C) 99 %      MAP       --         Physical Exam    Nursing note and vitals reviewed.  Constitutional: He appears well-developed and well-nourished.   HENT:   Head: Normocephalic and atraumatic.   Right Ear: External ear normal.   Left Ear: External ear normal.   Nose: Nose normal.   Mouth/Throat: " Oropharynx is clear and moist.   Eyes: Conjunctivae and EOM are normal. Pupils are equal, round, and reactive to light.   Neck: Normal range of motion. Neck supple.   Cardiovascular: Normal rate, regular rhythm, normal heart sounds and intact distal pulses.   Pulmonary/Chest: Breath sounds normal.   Abdominal: Soft. Bowel sounds are normal.   Musculoskeletal: Normal range of motion.   Neurological: He is alert and oriented to person, place, and time. He has normal strength. GCS score is 15. GCS eye subscore is 4. GCS verbal subscore is 5. GCS motor subscore is 6.   Skin: Skin is warm. Capillary refill takes less than 2 seconds.         ED Course   Procedures  Labs Reviewed - No data to display      Results for orders placed or performed during the hospital encounter of 08/21/19   CBC auto differential   Result Value Ref Range    WBC 6.46 3.90 - 12.70 K/uL    RBC 4.91 4.60 - 6.20 M/uL    Hemoglobin 14.7 14.0 - 18.0 g/dL    Hematocrit 44.9 40.0 - 54.0 %    Mean Corpuscular Volume 91 82 - 98 fL    Mean Corpuscular Hemoglobin 29.9 27.0 - 31.0 pg    Mean Corpuscular Hemoglobin Conc 32.7 32.0 - 36.0 g/dL    RDW 14.3 11.5 - 14.5 %    Platelets 259 150 - 350 K/uL    MPV 10.1 9.2 - 12.9 fL    Gran # (ANC) 3.2 1.8 - 7.7 K/uL    Lymph # 2.5 1.0 - 4.8 K/uL    Mono # 0.6 0.3 - 1.0 K/uL    Eos # 0.1 0.0 - 0.5 K/uL    Baso # 0.05 0.00 - 0.20 K/uL    Gran% 49.8 38.0 - 73.0 %    Lymph% 38.7 18.0 - 48.0 %    Mono% 9.6 4.0 - 15.0 %    Eosinophil% 1.1 0.0 - 8.0 %    Basophil% 0.8 0.0 - 1.9 %    Differential Method Automated    Comprehensive metabolic panel   Result Value Ref Range    Sodium 142 136 - 145 mmol/L    Potassium 3.5 3.5 - 5.1 mmol/L    Chloride 107 95 - 110 mmol/L    CO2 29 23 - 29 mmol/L    Glucose 90 70 - 110 mg/dL    BUN, Bld 10 2 - 20 mg/dL    Creatinine 1.01 0.50 - 1.40 mg/dL    Calcium 9.6 8.7 - 10.5 mg/dL    Total Protein 7.8 6.0 - 8.4 g/dL    Albumin 4.6 3.5 - 5.2 g/dL    Total Bilirubin 0.8 0.1 - 1.0 mg/dL     Alkaline Phosphatase 89 38 - 126 U/L    AST 22 15 - 46 U/L    ALT 12 10 - 44 U/L    Anion Gap 6 (L) 8 - 16 mmol/L    eGFR if African American >60.0 >60 mL/min/1.73 m^2    eGFR if non African American >60.0 >60 mL/min/1.73 m^2   Ethanol   Result Value Ref Range    Alcohol, Medical, Serum <10 <10 mg/dL   Acetaminophen level   Result Value Ref Range    Acetaminophen (Tylenol), Serum <10.0 10.0 - 20.0 ug/mL         Imaging Results    None          Medical Decision Making:   Clinical Tests:   Lab Tests: Ordered and Reviewed  Other:   I have discussed this case with another health care provider.       <> Summary of the Discussion: Discussed patient with psychiatry on-call who evaluated patient and at 1st did not think patient met criteria for a PEC.  Psychiatrist then talked with patient's mother who convinced psychiatrist that patient may actually be dangerous to himself or other people.  Mother reports patient has made harmful threats while and her presents.  Psychiatrist recommended PEC after all.    Additional MDM:   Psych: A Physician Emergency Certificate (PEC) was done in the ED for: [facility] Dis[facility]. The patient has been medically cleared. Outcome: The patient was approved for transfer to another facility.                    Clinical Impression:       ICD-10-CM ICD-9-CM   1. Acute exacerbation of psychosis F29 298.9   2. Paranoid schizophrenia F20.0 295.30                                Merlin Farooq MD  08/22/19 0035

## 2019-08-22 NOTE — ED NOTES
Pt's mothers name is Tootie Dangelo phone number is [phone]. Pt would like psychiatrist to speak with her.
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
## 2019-08-22 NOTE — ED NOTES
Pt in through triage for psych eval. Pt states he has not been taking his meds due to moving to new home and nurses have not been coming out like they were. States he has been having auditory and visual hallucinations. States sees and feels like people are watching him and out to get him. States he is tired and needs some help to make the hallucinations stop. Denies any SI or HI. States please call my mom and talk to her as well she has been dealing with it with me. Pt is cooperative and calm.

## 2019-08-22 NOTE — ED NOTES
Sonam Meza contacted to discuss pt UA results.  Spoke with TANG Deluna who reported MD with sonam is being made aware and will be treating pt accordingly.

## 2019-08-22 NOTE — CONSULTS
"[facility]  Psychiatry  Telepsychiatry Consult Note    Please see previous notes:    Patient agreeable to consultation via telepsychiatry.    Tele-Consultation from Psychiatry started: 8/21/2019 at 11:00 PM  The chief complaint leading to psychiatric consultation is: " Hearing voices and seeing things ."  This consultation was requested by Merlin Farooq MD, the Emergency Department attending physician.  The location of the consulting psychiatrist is  Home.  The patient location is  [facility] EMERGENCY DEPARTMENT   The patient arrived at the ED at:  See triage Notes    Also present with the patient at the time of the consultation:  ER Staff    Patient Identification:   Ej Marte is a 33 y.o. male.    Patient information was obtained from patient, parent, past medical records and  ER Note.  Patient presented voluntarily to the Emergency Department by private vehicle.    Consults  Subjective:     History of Present Illness:  No notes on file   Psychiatric Evaluation        "I havent been taking my medicines. I need to be admitted." Pt reports that he is "talking out of my head and seeing things following me." +Visual hallucinations. Denies SI/HI.      33-year-old male presents complaining of having a history of schizophrenia and not taking his medicines for over a month.  Patient reports he is very paranoid and feels people are following him and talking about him.  Patient is to paranoid to show up at his own home.  Patient is a parade people are waiting for him to get him.  Patient denies suicidal homicidal ideation.      ++++++++++++++++++++++++++++++++++++++++++++++++++++++++++++++++++++++++++++++++++++++++++++++++++++++++++++  Ej Marte is a 33 y.o. male with past psych h/o Schizophrenia and THC use d/o presented to ER in an acute psychotic state, he was reportedly paranoid and hearing voices and seeing things      Upon evaluation, he says he is doing fine. He says, "I was hearing voices and " "seeing things. I was on Risperdal and Vistaril. I have not been taking them for three or four days." He was smiling inappropriately and not making any eye contact. He says he is sleeping and eating okay. He denies depression, SI, or HI. He says that he just wants to get back on his medication. We decided to discharge him with Risperdal and Vistaril. When the nurse called his mother, she was very upset and wanted to talk to the psychiatrist.     I spoke with her on the phone. She says that he is not doing well. He is talking to himself. He has been aggressive towards her. He was missing for four or five days. She tried to get him to inpatient and got an OPC, but the police could not find him. She does not think that he will be safe. She is scared for both his safety and her own. She says, "Ma 'am you don't live with him, so you do not know what he does when he is off of his medication. I am pretty sure he will kill someone because he gets very paranoid and delusional." She does not know that he has been using synthetic marijuana. She says, "I don't want to take him home. I don't feel safe around him. I want him to get admitted to inpatient unit to get stabilized on medicine."         Psychiatric History:   Previous Psychiatric Hospitalizations: Yes  , multiple times   Previous Medication Trials: Yes  Multiple meds  Previous Suicide Attempts: no   History of Violence: No  History of Depression: Yes  History of Sarah: No  History of Auditory/Visual Hallucination Yes  History of Delusions: Yes  Outpatient psychiatrist (current & past): Yes    Substance Abuse History:  Tobacco:Yes  Alcohol: No  Illicit Substances:Yes, THC  Detox/Rehab: No  Past charges/incarcerations: yes    Legal History: Past charges/incarcerations: Yes Pending charges: denied                              Family Psychiatric History: UNknown      Social History:  Developmental/Childhood:Achieved all developmental milestones timely  *Education: " "10th  Employment Status/Finances:Employed   Relationship Status/Sexual Orientation: Single:    Children: 6  Housing Status: Home with mother   history:  NO  Access to gun: NO  Spiritism: NA  Recreational activities: Lost interest    Psychiatric Mental Status Exam:  Arousal: alert  Sensorium/Orientation: oriented to grossly intact  Behavior/Cooperation: reluctant to participate, psychomotor retardation, restless and fidgety , eye contact minimal   Speech: slowed, soft, spontaneous  Language: grossly intact  Mood: "  Fine "   Affect: inappropriate, blunted, anxious and constricted  Thought Process: circumstantial, poverty of thought  Thought Content:   Auditory hallucinations: YES:      Visual hallucinations: YES:      Paranoia: YES:      Delusions:  NO  Suicidal ideation: NO  Homicidal ideation: NO  Attention/Concentration:  intact  Memory:    Recent:  Intact   Remote: Intact     Fund of Knowledge: Intact     Insight: poor awareness of illness  Judgment: limited      Past Medical History:   Past Medical History:   Diagnosis Date    Bipolar disorder     History of psychiatric hospitalization     Hx of psychiatric care     Psychiatric exam requested by authority     Psychiatric problem     Psychosis     Schizoaffective disorder     Seizures     Substance abuse     Therapy       Laboratory Data:   Labs Reviewed   COMPREHENSIVE METABOLIC PANEL - Abnormal; Notable for the following components:       Result Value    Anion Gap 6 (*)     All other components within normal limits   CBC W/ AUTO DIFFERENTIAL   ALCOHOL,MEDICAL (ETHANOL)   ACETAMINOPHEN LEVEL   URINALYSIS, REFLEX TO URINE CULTURE   DRUG SCREEN PANEL, URINE EMERGENCY       Neurological History:  Seizures: No  Head trauma: No    Allergies:   Review of patient's allergies indicates:  No Known Allergies    Medications in ER:   Medications   haloperidol lactate injection 5 mg (5 mg Intramuscular Given 8/21/19 1931)   diphenhydrAMINE injection 50 mg " (50 mg Intramuscular Given 8/21/19 1931)       Medications at home: Risperdal and Vistaril     No new subjective & objective note has been filed under this hospital service since the last note was generated.      Assessment - Diagnosis - Goals:     Diagnosis/Impression: Schizophrenia , Cannanbis Use Disorder     Psychotic , paranoid , exhibiting threatening behavior , noncomplaint with meds  mother was concerned about his and her own safety.     Rec: PEC due to GD, needs to be hospitalized in Inpatient Psych unit for safety , stabilization and referral to Drug Rehab program.      Time with patient: 45 minutes       Laboratory Data:   Labs Reviewed   CBC W/ AUTO DIFFERENTIAL   COMPREHENSIVE METABOLIC PANEL   URINALYSIS, REFLEX TO URINE CULTURE   DRUG SCREEN PANEL, URINE EMERGENCY   ALCOHOL,MEDICAL (ETHANOL)   ACETAMINOPHEN LEVEL            Consulting clinician was informed of the encounter and consult note.         More than 50% of the time was spent counseling/coordinating care    Consulting clinician was informed of the encounter and consult note.    Consultation ended: 8/21/2019 at 12:30 AM    Ana Laura Ballesteros MD   Psychiatry  [facility]

## 2019-08-23 LAB
BACTERIA UR CULT: NORMAL
C TRACH DNA SPEC QL NAA+PROBE: NOT DETECTED
N GONORRHOEA DNA SPEC QL NAA+PROBE: NOT DETECTED

## 2020-04-21 ENCOUNTER — HOSPITAL ENCOUNTER (EMERGENCY)
Facility: HOSPITAL | Age: 35
Discharge: HOME | End: 2020-04-21
Attending: EMERGENCY MEDICINE
Payer: MEDICAID

## 2020-04-21 VITALS
RESPIRATION RATE: 20 BRPM | SYSTOLIC BLOOD PRESSURE: 154 MMHG | TEMPERATURE: 99 F | DIASTOLIC BLOOD PRESSURE: 88 MMHG | OXYGEN SATURATION: 97 % | HEART RATE: 109 BPM

## 2020-04-21 DIAGNOSIS — B37.2 CANDIDAL DERMATITIS: Primary | ICD-10-CM

## 2020-04-21 DIAGNOSIS — R00.0 TACHYCARDIA: ICD-10-CM

## 2020-04-21 PROCEDURE — 93010 EKG 12-LEAD: ICD-10-PCS | Mod: ,,, | Performed by: INTERNAL MEDICINE

## 2020-04-21 PROCEDURE — 93005 ELECTROCARDIOGRAM TRACING: CPT | Mod: ER

## 2020-04-21 PROCEDURE — 93010 ELECTROCARDIOGRAM REPORT: CPT | Mod: ,,, | Performed by: INTERNAL MEDICINE

## 2020-04-21 PROCEDURE — 99282 EMERGENCY DEPT VISIT SF MDM: CPT | Mod: 25,ER

## 2020-04-21 RX ORDER — NYSTATIN 100000 U/G
CREAM TOPICAL 2 TIMES DAILY
Qty: 15 G | Refills: 0 | Status: SHIPPED | OUTPATIENT
Start: 2020-04-21 | End: 2022-08-24 | Stop reason: CLARIF

## 2020-04-21 NOTE — DISCHARGE INSTRUCTIONS
You are instructed to follow up with your primary care provider for re-evaluation within 3 days.  You are instructed to return to the emergency department immediately for any new or worsening symptoms right

## 2020-04-21 NOTE — ED PROVIDER NOTES
Encounter Date: 4/21/2020       History     Chief Complaint   Patient presents with    Rash     I have a rash on the skin of my penis and has been there for 2 weeks. Denies itching . Keshawn Discharge     34-year-old male presents to the emergency department for evaluation of 2.5 weeks of rash to his penis.  He reports noticing the rash approximately 2.5 week ago which has been constant and improving since onset.  He reports that for the 1st several days seemed mildly itchy, however over the last week it has not seem to bother him.  He reports that initially it was mildly red, but has been improving since that time.  He does report protected sex with a female partner, but denies any penile discharge, dysuria or generalized rash.  No treatment was attempted prior to arrival.        Review of patient's allergies indicates:  No Known Allergies  Past Medical History:   Diagnosis Date    Bipolar disorder     History of psychiatric hospitalization     Hx of psychiatric care     Psychiatric exam requested by authority     Psychiatric problem     Psychosis     Schizoaffective disorder     Seizures     Substance abuse     Therapy      History reviewed. No pertinent surgical history.  History reviewed. No pertinent family history.  Social History     Tobacco Use    Smoking status: Current Every Day Smoker     Types: Cigarettes    Smokeless tobacco: Never Used   Substance Use Topics    Alcohol use: No     Frequency: Never    Drug use: Yes     Types: Marijuana     Comment: MOJO, other drugs unknown     Review of Systems   Constitutional: Negative for activity change, appetite change and fever.   HENT: Negative for sinus pain and sore throat.    Respiratory: Negative for cough and shortness of breath.    Cardiovascular: Negative for chest pain.   Gastrointestinal: Negative for abdominal pain, constipation, diarrhea, nausea and vomiting.   Genitourinary: Negative for decreased urine volume, discharge, dysuria, flank  pain, genital sores, hematuria, penile pain, penile swelling, scrotal swelling and testicular pain.   Musculoskeletal: Negative for back pain, joint swelling, neck pain and neck stiffness.   Skin: Positive for rash.   Neurological: Negative for dizziness, seizures, syncope, weakness, light-headedness and numbness.   Hematological: Does not bruise/bleed easily.       Physical Exam     Initial Vitals [04/21/20 1046]   BP Pulse Resp Temp SpO2   (!) 164/90 (!) 134 16 99.2 °F (37.3 °C) 99 %      MAP       --         Physical Exam    Nursing note and vitals reviewed.  Constitutional: He appears well-developed and well-nourished. He is not diaphoretic. No distress.   HENT:   Head: Normocephalic and atraumatic.   Right Ear: External ear normal.   Left Ear: External ear normal.   Mouth/Throat: Oropharynx is clear and moist. No oropharyngeal exudate.   Eyes: Conjunctivae and EOM are normal. Pupils are equal, round, and reactive to light.   Neck: Normal range of motion.   Cardiovascular: Normal rate, regular rhythm and normal heart sounds.   Pulmonary/Chest: Breath sounds normal. No respiratory distress. He has no wheezes. He has no rhonchi. He has no rales. He exhibits no tenderness.   Abdominal: Soft. There is no tenderness.   Genitourinary: Testes normal and penis normal. Cremasteric reflex is present. Uncircumcised. No phimosis, paraphimosis, penile erythema or penile tenderness. No discharge found.         Neurological: He is alert and oriented to person, place, and time.   Skin: Skin is warm and dry.   Psychiatric: He has a normal mood and affect.         ED Course   Procedures  Labs Reviewed - No data to display  EKG Readings: (Independently Interpreted)   Initial Reading: No STEMI. Rhythm: Sinus Tachycardia. Heart Rate: 114.       Imaging Results    None          Medical Decision Making:   Initial Assessment:   34-year-old male presents to the emergency department for evaluation healing now rash.  Physical exam reveals  a nontoxic-appearing male in no acute distress.  Patient is afebrile, tachycardic, but other vital signs normal limits.  Neurological exam reveals an alert and oriented patient.  Neck is supple, no meningeal signs noted.  Lungs clear to auscultation bilaterally abdominal exam reveals soft abdomen, nontender to palpation.   exam reveals Scant smegma noted under the foreskin.  No generalized rash noted over the penis or scrotum.  No erythema, edema or induration noted.  No vesicles, pustules, ulcerations, or chancre noted.  Differential Diagnosis:   Candidal dermatitis   I carefully considered but doubt serious etiology including cellulitis  Gonorrhea and chlamydia test were offered, patient declined stating that he is not having dysuria or penile drainage  ED Management:  EKG reveal no acute ST changes.  Will prescribe nystatin cream.  Instructed the patient to follow up his primary care provider re-evaluation to return to emergency department for any or worsening symptoms.  Instructed patient to return to the emergency department immediately for any new or worsening symptoms.                                 Clinical Impression:       ICD-10-CM ICD-9-CM   1. Candidal dermatitis B37.2 112.3   2. Tachycardia R00.0 785.0                                Yakelin Hu PA-C  04/21/20 1122

## 2020-09-06 ENCOUNTER — HOSPITAL ENCOUNTER (EMERGENCY)
Facility: HOSPITAL | Age: 35
Discharge: HOME | End: 2020-09-06
Attending: EMERGENCY MEDICINE
Payer: MEDICAID

## 2020-09-06 VITALS
BODY MASS INDEX: 22.78 KG/M2 | HEART RATE: 81 BPM | SYSTOLIC BLOOD PRESSURE: 181 MMHG | RESPIRATION RATE: 18 BRPM | OXYGEN SATURATION: 96 % | WEIGHT: 168.19 LBS | DIASTOLIC BLOOD PRESSURE: 67 MMHG | HEIGHT: 72 IN | TEMPERATURE: 99 F

## 2020-09-06 DIAGNOSIS — S53.401A SPRAIN OF RIGHT ELBOW, INITIAL ENCOUNTER: Primary | ICD-10-CM
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
## 2020-09-06 PROCEDURE — 25000003 PHARM REV CODE 250: Performed by: NURSE PRACTITIONER

## 2020-09-06 PROCEDURE — 99284 EMERGENCY DEPT VISIT MOD MDM: CPT | Mod: 25

## 2020-09-06 RX ORDER — KETOROLAC TROMETHAMINE 10 MG/1
10 TABLET, FILM COATED ORAL
Status: COMPLETED | OUTPATIENT
Start: 2020-09-06 | End: 2020-09-06

## 2020-09-06 RX ORDER — DEXAMETHASONE 4 MG/1
4 TABLET ORAL DAILY
Qty: 5 TABLET | Refills: 0 | Status: SHIPPED | OUTPATIENT
Start: 2020-09-06 | End: 2020-09-11

## 2020-09-06 RX ORDER — DICLOFENAC SODIUM 50 MG/1
50 TABLET, DELAYED RELEASE ORAL 3 TIMES DAILY PRN
Qty: 15 TABLET | Refills: 0 | Status: SHIPPED | OUTPATIENT
Start: 2020-09-06 | End: 2022-08-24 | Stop reason: CLARIF

## 2020-09-06 RX ADMIN — KETOROLAC TROMETHAMINE 10 MG: 10 TABLET, FILM COATED ORAL at 08:09
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
## 2022-07-10 ENCOUNTER — HOSPITAL ENCOUNTER (EMERGENCY)
Facility: HOSPITAL | Age: 37
Discharge: HOME | End: 2022-07-10
Attending: FAMILY MEDICINE
Payer: MEDICARE

## 2022-07-10 VITALS
OXYGEN SATURATION: 97 % | HEART RATE: 93 BPM | DIASTOLIC BLOOD PRESSURE: 61 MMHG | TEMPERATURE: 99 F | WEIGHT: 175 LBS | RESPIRATION RATE: 18 BRPM | SYSTOLIC BLOOD PRESSURE: 131 MMHG | HEIGHT: 72 IN | BODY MASS INDEX: 23.7 KG/M2

## 2022-07-10 DIAGNOSIS — U07.1 COVID-19: Primary | ICD-10-CM

## 2022-07-10 LAB
BILIRUB UR QL STRIP: NEGATIVE
CLARITY UR REFRACT.AUTO: CLEAR
COLOR UR AUTO: YELLOW
GLUCOSE UR QL STRIP: NEGATIVE
HGB UR QL STRIP: NEGATIVE
KETONES UR QL STRIP: ABNORMAL
LEUKOCYTE ESTERASE UR QL STRIP: NEGATIVE
NITRITE UR QL STRIP: NEGATIVE
PH UR STRIP: 7 [PH] (ref 5–8)
PROT UR QL STRIP: ABNORMAL
SARS-COV-2 RDRP RESP QL NAA+PROBE: POSITIVE
SP GR UR STRIP: 1.02 (ref 1–1.03)
URN SPEC COLLECT METH UR: ABNORMAL
UROBILINOGEN UR STRIP-ACNC: NEGATIVE EU/DL

## 2022-07-10 PROCEDURE — 81003 URINALYSIS AUTO W/O SCOPE: CPT | Mod: ER | Performed by: FAMILY MEDICINE

## 2022-07-10 PROCEDURE — 99283 EMERGENCY DEPT VISIT LOW MDM: CPT | Mod: 25,ER

## 2022-07-10 PROCEDURE — 25000003 PHARM REV CODE 250: Mod: ER | Performed by: FAMILY MEDICINE

## 2022-07-10 PROCEDURE — U0002 COVID-19 LAB TEST NON-CDC: HCPCS | Mod: ER | Performed by: FAMILY MEDICINE

## 2022-07-10 RX ORDER — KETOROLAC TROMETHAMINE 10 MG/1
10 TABLET, FILM COATED ORAL
Status: COMPLETED | OUTPATIENT
Start: 2022-07-10 | End: 2022-07-10

## 2022-07-10 RX ORDER — QUETIAPINE FUMARATE 100 MG/1
100 TABLET, FILM COATED ORAL NIGHTLY
Qty: 30 TABLET | Refills: 0 | OUTPATIENT
Start: 2022-07-10 | End: 2022-08-24

## 2022-07-10 RX ADMIN — KETOROLAC TROMETHAMINE 10 MG: 10 TABLET, FILM COATED ORAL at 07:07

## 2022-07-10 NOTE — ED PROVIDER NOTES
Encounter Date: 7/10/2022       History     Chief Complaint   Patient presents with    Headache     Started this am, patient denies taking anything for his symptoms      36-year-old male complains of headache since yesterday after cutting grass.  Change in vision in his right eye briefly for few seconds..  Nasal congestion.  No nausea or vomiting.  Vision is normal.  No fever.  No chest pain.  No fever, Tolerating fluid and making urine.    The history is provided by the patient.     Review of patient's allergies indicates:  No Known Allergies  Past Medical History:   Diagnosis Date    Bipolar disorder     History of psychiatric hospitalization     Hx of psychiatric care     Psychiatric exam requested by authority     Psychiatric problem     Psychosis     Schizoaffective disorder     Seizures     Substance abuse     Therapy      No past surgical history on file.  No family history on file.  Social History     Tobacco Use    Smoking status: Current Every Day Smoker     Types: Cigarettes    Smokeless tobacco: Never Used   Substance Use Topics    Alcohol use: No    Drug use: Yes     Types: Marijuana     Comment: MOJO, other drugs unknown     Review of Systems   Constitutional: Negative for activity change, appetite change and fever.   HENT: Positive for rhinorrhea. Negative for sore throat.    Eyes: Positive for visual disturbance.   Respiratory: Negative for shortness of breath.    Cardiovascular: Negative for chest pain.   Gastrointestinal: Negative for nausea.   Genitourinary: Negative for dysuria.   Musculoskeletal: Negative for back pain.   Skin: Negative for rash.   Neurological: Positive for headaches. Negative for dizziness, speech difficulty, weakness, light-headedness and numbness.   Hematological: Does not bruise/bleed easily.   All other systems reviewed and are negative.      Physical Exam     Initial Vitals [07/10/22 0746]   BP Pulse Resp Temp SpO2   131/61 93 18 98.7 °F (37.1 °C) 97 %       MAP       --         Physical Exam    Nursing note and vitals reviewed.  Constitutional: He appears well-developed and well-nourished. He is not diaphoretic. No distress.   HENT:   Head: Normocephalic.   Eyes: EOM are normal.   Cardiovascular: Normal rate.   Pulmonary/Chest: Breath sounds normal. No respiratory distress. He has no wheezes. He has no rhonchi. He has no rales.   Abdominal: Abdomen is soft. He exhibits no distension. There is no abdominal tenderness. There is no rebound and no guarding.   Musculoskeletal:         General: Normal range of motion.     Neurological: He is oriented to person, place, and time. He has normal strength. GCS score is 15. GCS eye subscore is 4. GCS verbal subscore is 5. GCS motor subscore is 6.   Skin: Skin is warm. Capillary refill takes less than 2 seconds.   Psychiatric: He has a normal mood and affect. Thought content normal.         ED Course   Procedures  Labs Reviewed   SARS-COV-2 RNA AMPLIFICATION, QUAL - Abnormal; Notable for the following components:       Result Value    SARS-CoV-2 RNA, Amplification, Qual Positive (*)     All other components within normal limits    Narrative:     Is the patient symptomatic?->Yes    result(s) called and verbal readback obtained from Ashli Hunt RN   by CHIQUITA 07/10/2022 08:18   URINALYSIS, REFLEX TO URINE CULTURE - Abnormal; Notable for the following components:    Protein, UA Trace (*)     Ketones, UA Trace (*)     All other components within normal limits    Narrative:     Preferred Collection Type->Urine, Clean Catch  Specimen Source->Urine  Collection Type->Urine, Clean Catch          Imaging Results    None          Medications   ketorolac tablet 10 mg (10 mg Oral Given 7/10/22 0753)     Medical Decision Making:   Clinical Tests:   Lab Tests: Ordered and Reviewed  ED Management:  Patient is positive for COVID.  Adequate hydration.  Nutrition, Tylenol or Motrin as needed.  Refill his Seroquel.  Follow-up ED with any worsening  symptoms.                      Clinical Impression:   Final diagnoses:  [U07.1] COVID-19 (Primary)          ED Disposition Condition    Discharge Stable        ED Prescriptions     Medication Sig Dispense Start Date End Date Auth. Provider    QUEtiapine (SEROQUEL) 100 MG Tab Take 1 tablet (100 mg total) by mouth every evening. 30 tablet 7/10/2022  Nico Gonzales MD        Follow-up Information     Follow up With Specialties Details Why Contact Info    Mark Matthews Jr., MD Family Medicine Schedule an appointment as soon as possible for a visit   [address] [facility] [address] [phone]             Nico Gonzales MD  07/10/22 0837

## 2022-08-24 ENCOUNTER — HOSPITAL ENCOUNTER (EMERGENCY)
Facility: HOSPITAL | Age: 37
Discharge: HOME | End: 2022-08-24
Attending: EMERGENCY MEDICINE
Payer: MEDICARE

## 2022-08-24 VITALS
WEIGHT: 180 LBS | DIASTOLIC BLOOD PRESSURE: 77 MMHG | BODY MASS INDEX: 24.41 KG/M2 | OXYGEN SATURATION: 98 % | SYSTOLIC BLOOD PRESSURE: 140 MMHG | HEART RATE: 73 BPM | RESPIRATION RATE: 15 BRPM | TEMPERATURE: 98 F

## 2022-08-24 DIAGNOSIS — Z76.0 MEDICATION REFILL: Primary | ICD-10-CM

## 2022-08-24 DIAGNOSIS — F20.89 OTHER SCHIZOPHRENIA: Chronic | ICD-10-CM

## 2022-08-24 PROCEDURE — 99283 EMERGENCY DEPT VISIT LOW MDM: CPT | Mod: ER

## 2022-08-24 RX ORDER — QUETIAPINE FUMARATE 100 MG/1
100 TABLET, FILM COATED ORAL DAILY
Qty: 30 TABLET | Refills: 0 | OUTPATIENT
Start: 2022-08-24 | End: 2022-09-24

## 2022-08-24 RX ORDER — QUETIAPINE FUMARATE 100 MG/1
100 TABLET, FILM COATED ORAL
Status: DISCONTINUED | OUTPATIENT
Start: 2022-08-24 | End: 2022-08-24

## 2022-08-24 NOTE — ED PROVIDER NOTES
Encounter Date: 8/24/2022       History     Chief Complaint   Patient presents with    Medication Refill     I need my medicine refilled seroquel 100 mg nightly. I havent had it in to weeks.      Chief complaint:  Medication refill    HPI:  36 year old male with history of bipolar disorder, schizoaffective disorder, seizures, substance abuse presenting requesting refill of Seroquel.  Review of chart shows patient last received refill for medication last month and was instructed to follow up with Dr. Matthews his primary care doctor.  He states he has not followed up with his primary care doctor.  He has been out of medication for the past 2 weeks.  He states he is intermittently having difficulty sleeping.  Denies any suicidal homicidal ideation or auditory or visual hallucinations. He does not see fever help or psychiatry.  He is requesting referral.  No complaints at this time.        Review of patient's allergies indicates:  No Known Allergies  Past Medical History:   Diagnosis Date    Bipolar disorder     History of psychiatric hospitalization     Hx of psychiatric care     Psychiatric exam requested by authority     Psychiatric problem     Psychosis     Schizoaffective disorder     Seizures     Substance abuse     Therapy      History reviewed. No pertinent surgical history.  History reviewed. No pertinent family history.  Social History     Tobacco Use    Smoking status: Current Every Day Smoker     Types: Cigarettes    Smokeless tobacco: Never Used   Substance Use Topics    Alcohol use: No    Drug use: Yes     Types: Marijuana     Comment: MOJO, other drugs unknown     Review of Systems   Constitutional: Negative for chills and fever.   HENT: Negative for congestion, ear pain, rhinorrhea and sore throat.    Eyes: Negative for redness.   Respiratory: Negative for shortness of breath and stridor.    Cardiovascular: Negative for chest pain.   Gastrointestinal: Negative for abdominal pain,  constipation, diarrhea, nausea and vomiting.   Genitourinary: Negative for dysuria, frequency, hematuria and urgency.   Musculoskeletal: Negative for back pain and neck pain.   Skin: Negative for rash.   Neurological: Negative for dizziness, speech difficulty, weakness, light-headedness and numbness.   Hematological: Does not bruise/bleed easily.   Psychiatric/Behavioral: Positive for sleep disturbance. Negative for confusion, hallucinations, self-injury and suicidal ideas. The patient is not nervous/anxious.        Physical Exam     Initial Vitals [08/24/22 1018]   BP Pulse Resp Temp SpO2   (!) 140/77 73 15 98.3 °F (36.8 °C) 98 %      MAP       --         Physical Exam    Nursing note and vitals reviewed.  Constitutional: He appears well-developed and well-nourished. No distress.   HENT:   Head: Normocephalic.   Right Ear: External ear normal.   Left Ear: External ear normal.   Eyes: Conjunctivae are normal.   Cardiovascular: Normal rate and regular rhythm. Exam reveals no gallop and no friction rub.    No murmur heard.  Pulmonary/Chest: Breath sounds normal. No respiratory distress. He has no wheezes. He has no rhonchi. He has no rales.   Abdominal: Abdomen is soft. He exhibits no distension. There is no abdominal tenderness. There is no rebound and no guarding.   Musculoskeletal:         General: Normal range of motion.     Neurological: He is alert.   Skin: Skin is warm and dry. No rash noted.   Psychiatric: He has a normal mood and affect. His behavior is normal. Judgment and thought content normal.         ED Course   Procedures  Labs Reviewed - No data to display       Imaging Results    None          Medications - No data to display  Medical Decision Making:   ED Management:  36-year-old male with history of schizophrenia bipolar disorder requesting refill of Seroquel.  No SI HI  VH.  Requesting referral to Psychiatry/Behavioral Health.  Referral placed.  [facility]  Will [facility] 1 month refill. Encouraged to contact his pcp today as well                         Clinical Impression:   Final diagnoses:  [Z76.0] Medication refill (Primary)  [F20.89] Other schizophrenia (Chronic)          ED Disposition Condition    Discharge Stable        ED Prescriptions     Medication Sig Dispense Start Date End Date Auth. Provider    QUEtiapine (SEROQUEL) 100 MG Tab Take 1 tablet (100 mg total) by mouth once daily. 30 tablet 8/24/2022 9/23/2022 Cyndee Guerrero PA-C        Follow-up Information     Follow up With Specialties Details Why Contact Info    Mark Matthews Jr., MD Family Medicine   [address] [facility] [address] [phone]      [facility] - Emergency Dept Emergency Medicine Go to  As needed, If symptoms worsen [address] [facility] [address] [phone]    [facility], Psychiatry, Psychology Schedule an appointment as soon as possible for a visit today for follow up [address] [facility] [address] [phone]             Cyndee Guerrero PA-C  08/24/22 1048

## 2022-08-24 NOTE — ED NOTES
Pt aao times 4 resp even non labored. Skin warm and dry. Denies HI/SI Denies any auditory or visual hallucinations.

## 2022-08-24 NOTE — DISCHARGE INSTRUCTIONS
Contact your primary care today.   Call [facility] today.   Thank you for coming to our Emergency Department today. It is important to remember that some problems are difficult to diagnose and may not be found during your first visit. Be sure to follow up with your primary care doctor.    Return to the ER with any questions/concerns, new/concerning symptoms, worsening or failure to improve. Do not drive or make any important decisions for 24 hours if you have received any pain medications, sedatives or mood altering drugs during your ER visit.

## 2022-09-24 ENCOUNTER — HOSPITAL ENCOUNTER (EMERGENCY)
Facility: HOSPITAL | Age: 37
Discharge: HOME | End: 2022-09-24
Attending: EMERGENCY MEDICINE
Payer: MEDICARE

## 2022-09-24 VITALS
SYSTOLIC BLOOD PRESSURE: 176 MMHG | OXYGEN SATURATION: 99 % | TEMPERATURE: 99 F | WEIGHT: 185 LBS | HEIGHT: 72 IN | BODY MASS INDEX: 25.06 KG/M2 | HEART RATE: 72 BPM | DIASTOLIC BLOOD PRESSURE: 94 MMHG | RESPIRATION RATE: 20 BRPM

## 2022-09-24 DIAGNOSIS — Z76.0 MEDICATION REFILL: Primary | ICD-10-CM

## 2022-09-24 PROCEDURE — 99283 EMERGENCY DEPT VISIT LOW MDM: CPT | Mod: ER

## 2022-09-24 RX ORDER — QUETIAPINE FUMARATE 100 MG/1
100 TABLET, FILM COATED ORAL DAILY
Qty: 30 TABLET | Refills: 0 | Status: SHIPPED | OUTPATIENT
Start: 2022-09-24 | End: 2023-06-20 | Stop reason: SDUPTHER

## 2022-09-25 NOTE — ED PROVIDER NOTES
Encounter Date: 9/24/2022       History     Chief Complaint   Patient presents with    Medication Refill     Pt reports he is out of his seroquel and needs a refill.     HPI: Ej Marte Jr., a 36 y.o. male  has a past medical history of Bipolar disorder, History of psychiatric hospitalization, psychiatric care, Psychiatric exam requested by authority, Psychiatric problem, Psychosis, Schizoaffective disorder, Seizures, Substance abuse, and Therapy.     He presents to the ED requesting medication refill for Seroquel.  Has no complaints.        The history is provided by the patient.   Review of patient's allergies indicates:  No Known Allergies  Past Medical History:   Diagnosis Date    Bipolar disorder     History of psychiatric hospitalization     Hx of psychiatric care     Psychiatric exam requested by authority     Psychiatric problem     Psychosis     Schizoaffective disorder     Seizures     Substance abuse     Therapy      History reviewed. No pertinent surgical history.  History reviewed. No pertinent family history.  Social History     Tobacco Use    Smoking status: Former     Types: Cigarettes    Smokeless tobacco: Never   Substance Use Topics    Alcohol use: No    Drug use: Not Currently     Types: Marijuana     Comment: MOJO, other drugs unknown     Review of Systems   Constitutional:  Negative for fever.   Gastrointestinal:  Negative for nausea and vomiting.   Skin:  Negative for color change.   Psychiatric/Behavioral:  Positive for sleep disturbance.    All other systems reviewed and are negative.    Physical Exam     Initial Vitals [09/24/22 1443]   BP Pulse Resp Temp SpO2   (!) 176/94 72 20 98.6 °F (37 °C) 99 %      MAP       --         Physical Exam    Nursing note and vitals reviewed.  Constitutional: He appears well-developed and well-nourished. He is not diaphoretic. No distress.   HENT:   Head: Normocephalic and atraumatic.   Right Ear: External ear normal.   Left Ear: External ear normal.    Eyes: Conjunctivae are normal.   Cardiovascular:  Normal rate and regular rhythm.           Pulmonary/Chest: No respiratory distress.   Musculoskeletal:         General: Normal range of motion.     Neurological: He is alert and oriented to person, place, and time.   Psychiatric: He has a normal mood and affect. Thought content normal.       ED Course   Procedures  Labs Reviewed - No data to display       Imaging Results    None          Medications - No data to display  Medical Decision Making:   Initial Assessment:   Medication refill, no complaints  ED Management:  Patient presents to the ER requesting refill for Seroquel with no complaints.  Has difficulty getting to his primary care doctor due to his hours of his work.  Refill was granted.  Patient encouraged to follow-up with PCP                        Clinical Impression:   Final diagnoses:  [Z76.0] Medication refill (Primary)        ED Disposition Condition    Discharge Stable          ED Prescriptions       Medication Sig Dispense Start Date End Date Auth. Provider    QUEtiapine (SEROQUEL) 100 MG Tab Take 1 tablet (100 mg total) by mouth once daily. 30 tablet 9/24/2022 9/24/2023 Holly Johnson PA-C          Follow-up Information       Follow up With Specialties Details Why Contact Info    Mark Matthews Jr., MD Family Medicine   [address] [facility] [address]  [phone]               Holly Johnson PA-C  09/24/22 2046

## 2022-09-29 ENCOUNTER — HOSPITAL ENCOUNTER (EMERGENCY)
Facility: HOSPITAL | Age: 37
Discharge: HOME | End: 2022-09-29
Attending: EMERGENCY MEDICINE
Payer: MEDICARE

## 2022-09-29 VITALS
BODY MASS INDEX: 24.38 KG/M2 | OXYGEN SATURATION: 98 % | DIASTOLIC BLOOD PRESSURE: 94 MMHG | SYSTOLIC BLOOD PRESSURE: 163 MMHG | TEMPERATURE: 99 F | HEIGHT: 72 IN | RESPIRATION RATE: 20 BRPM | WEIGHT: 180 LBS | HEART RATE: 91 BPM

## 2022-09-29 DIAGNOSIS — H72.91 PERFORATION OF RIGHT TYMPANIC MEMBRANE: Primary | ICD-10-CM

## 2022-09-29 PROCEDURE — 99284 EMERGENCY DEPT VISIT MOD MDM: CPT | Mod: 25,ER

## 2022-09-29 RX ORDER — OFLOXACIN 3 MG/ML
3 SOLUTION AURICULAR (OTIC) 2 TIMES DAILY
Qty: 5 ML | Refills: 0 | Status: SHIPPED | OUTPATIENT
Start: 2022-09-29 | End: 2022-10-06

## 2022-09-29 RX ORDER — AMOXICILLIN AND CLAVULANATE POTASSIUM 875; 125 MG/1; MG/1
1 TABLET, FILM COATED ORAL 2 TIMES DAILY
Qty: 14 TABLET | Refills: 0 | Status: SHIPPED | OUTPATIENT
Start: 2022-09-29

## 2022-09-29 NOTE — ED PROVIDER NOTES
"Encounter Date: 9/29/2022       History     Chief Complaint   Patient presents with    Ear Injury     Reports cleaning his right ear yesterday morning with a "rat tail comb" and jerked while it was in his ear. Patient states his inner ear has been bleeding off and on ever since. Pt states his hearing in that ear is more dull as well.     HPI: Ej Marte Jr., a 36 y.o. male  has a past medical history of Bipolar disorder, History of psychiatric hospitalization, psychiatric care, Psychiatric exam requested by authority, Psychiatric problem, Psychosis, Schizoaffective disorder, Seizures, Substance abuse, and Therapy.     She presents to the ED for evaluation of right ear pain after he was trying to clean it out with a rat tail comb yesterday.  Attests to bleeding to site and decreased hearing.  No treatments tried.          The history is provided by the patient.   Review of patient's allergies indicates:  No Known Allergies  Past Medical History:   Diagnosis Date    Bipolar disorder     History of psychiatric hospitalization     Hx of psychiatric care     Psychiatric exam requested by authority     Psychiatric problem     Psychosis     Schizoaffective disorder     Seizures     Substance abuse     Therapy      No past surgical history on file.  No family history on file.  Social History     Tobacco Use    Smoking status: Former     Types: Cigarettes    Smokeless tobacco: Never   Substance Use Topics    Alcohol use: No    Drug use: Not Currently     Types: Marijuana     Comment: MOJO, other drugs unknown     Review of Systems   Constitutional:  Negative for fever.   HENT:  Positive for ear discharge, ear pain and hearing loss.    Skin:  Negative for color change and rash.   Allergic/Immunologic: Negative for immunocompromised state.   All other systems reviewed and are negative.    Physical Exam     Initial Vitals [09/29/22 1613]   BP Pulse Resp Temp SpO2   (!) 163/94 91 20 98.8 °F (37.1 °C) 98 %      MAP       --   "       Physical Exam    Nursing note and vitals reviewed.  Constitutional: He appears well-developed and well-nourished. He is not diaphoretic. No distress.   HENT:   Head: Normocephalic and atraumatic.   Right Ear: External ear normal. There is drainage (bleeding) and tenderness. Tympanic membrane is perforated.   Left Ear: Hearing, tympanic membrane, external ear and ear canal normal.   Eyes: Conjunctivae are normal.   Cardiovascular:  Normal rate and regular rhythm.           Pulmonary/Chest: No respiratory distress.   Musculoskeletal:         General: Normal range of motion.     Neurological: He is alert and oriented to person, place, and time.   Skin: Capillary refill takes less than 2 seconds.   Psychiatric: He has a normal mood and affect. Thought content normal.       ED Course   Procedures  Labs Reviewed - No data to display       Imaging Results    None          Medications - No data to display  Medical Decision Making:   Initial Assessment:   Right ear trauma   Differential Diagnosis:   AOM, AOE, ruptured TM   ED Management:  Pt presents to ED for evaluation of right ear pain after trauma.  Ruptured TM.  Will place on oral and topic ABX with referral to ENT.  Instructed to refrain from getting water to site.  Instructed to return with any new or worsening symptoms.                        Clinical Impression:   Final diagnoses:  [H72.91] Perforation of right tympanic membrane (Primary)      ED Disposition Condition    Discharge Stable          ED Prescriptions       Medication Sig Dispense Start Date End Date Auth. Provider    ofloxacin (FLOXIN) 0.3 % otic solution Place 3 drops into the right ear 2 (two) times daily. for 7 days 5 mL 9/29/2022 10/6/2022 Holly Johnson PA-C    amoxicillin-clavulanate 875-125mg (AUGMENTIN) 875-125 mg per tablet Take 1 tablet by mouth 2 (two) times daily. 14 tablet 9/29/2022 -- Holly Johnson PA-C          Follow-up Information       Follow up With Specialties Details  Why Contact Info    Sierra Mario MD Otolaryngology   [address]  [address]  [facility] [address]  [phone]               Holly Johnson PA-C  09/29/22 8179

## 2023-06-20 ENCOUNTER — HOSPITAL ENCOUNTER (EMERGENCY)
Facility: HOSPITAL | Age: 38
Discharge: HOME | End: 2023-06-20
Attending: EMERGENCY MEDICINE
Payer: MEDICARE

## 2023-06-20 VITALS
DIASTOLIC BLOOD PRESSURE: 73 MMHG | HEART RATE: 74 BPM | HEIGHT: 72 IN | TEMPERATURE: 98 F | BODY MASS INDEX: 25.73 KG/M2 | WEIGHT: 190 LBS | OXYGEN SATURATION: 98 % | RESPIRATION RATE: 20 BRPM | SYSTOLIC BLOOD PRESSURE: 136 MMHG

## 2023-06-20 DIAGNOSIS — Z76.0 MEDICATION REFILL: Primary | ICD-10-CM

## 2023-06-20 PROCEDURE — 99282 EMERGENCY DEPT VISIT SF MDM: CPT | Mod: ER

## 2023-06-20 RX ORDER — QUETIAPINE FUMARATE 100 MG/1
100 TABLET, FILM COATED ORAL DAILY
Qty: 30 TABLET | Refills: 0 | Status: SHIPPED | OUTPATIENT
Start: 2023-06-20 | End: 2024-06-19

## 2023-06-20 NOTE — ED PROVIDER NOTES
Encounter Date: 6/20/2023       History     Chief Complaint   Patient presents with    Medication Refill     Pt states he needs his seroquel refilled.     Patient is a 37-year-old male presenting with request for refill on Seroquel.  No SI/HI.  He is trying to find a new primary care and has run out of his medication.    The history is provided by the patient.   Review of patient's allergies indicates:  No Known Allergies  Past Medical History:   Diagnosis Date    Bipolar disorder     History of psychiatric hospitalization     Hx of psychiatric care     Psychiatric exam requested by authority     Psychiatric problem     Psychosis     Schizoaffective disorder     Seizures     Substance abuse     Therapy      History reviewed. No pertinent surgical history.  History reviewed. No pertinent family history.  Social History     Tobacco Use    Smoking status: Former     Types: Cigarettes    Smokeless tobacco: Never   Substance Use Topics    Alcohol use: No    Drug use: Not Currently     Types: Marijuana     Comment: MOJO, other drugs unknown     Review of Systems   Constitutional:  Negative for activity change, appetite change, chills and fever.   Psychiatric/Behavioral:  Positive for sleep disturbance. Negative for confusion and suicidal ideas.    All other systems reviewed and are negative.    Physical Exam     Initial Vitals [06/20/23 1436]   BP Pulse Resp Temp SpO2   136/73 74 20 97.9 °F (36.6 °C) 98 %      MAP       --         Physical Exam    Nursing note and vitals reviewed.  Constitutional: He appears well-developed and well-nourished. No distress.   Cardiovascular:  Normal rate.           Pulmonary/Chest: No respiratory distress.     Neurological: He is alert and oriented to person, place, and time.   Skin: Skin is warm and dry.   Psychiatric: He has a normal mood and affect. His behavior is normal. Judgment and thought content normal.       ED Course   Procedures  Labs Reviewed - No data to display       Imaging  Results    None          Medications - No data to display  Medical Decision Making:   ED Management:  Patient requesting refill on Seroquel.  No SI/HI.  He states he is not taking any other psychiatric medications.  I did give him 1 month refill and advised him to schedule an appointment now with primary care as it may take them some time to get him in.  He will return the emergency department if worse in any way.                        Clinical Impression:   Final diagnoses:  [Z76.0] Medication refill (Primary)        ED Disposition Condition    Discharge Stable          ED Prescriptions       Medication Sig Dispense Start Date End Date Auth. Provider    QUEtiapine (SEROQUEL) 100 MG Tab Take 1 tablet (100 mg total) by mouth once daily. 30 tablet 6/20/2023 6/19/2024 ARLEEN Cunningham          Follow-up Information    None          ARLEEN Cunningham  06/20/23 6642

## 2024-06-26 ENCOUNTER — HOSPITAL ENCOUNTER (EMERGENCY)
Facility: HOSPITAL | Age: 39
Discharge: HOME | End: 2024-06-26
Attending: EMERGENCY MEDICINE
Payer: MEDICARE

## 2024-06-26 VITALS
OXYGEN SATURATION: 98 % | HEART RATE: 69 BPM | RESPIRATION RATE: 18 BRPM | SYSTOLIC BLOOD PRESSURE: 153 MMHG | TEMPERATURE: 99 F | WEIGHT: 174.81 LBS | DIASTOLIC BLOOD PRESSURE: 103 MMHG | BODY MASS INDEX: 23.68 KG/M2 | HEIGHT: 72 IN

## 2024-06-26 DIAGNOSIS — Z76.0 MEDICATION REFILL: Primary | ICD-10-CM

## 2024-06-26 PROCEDURE — 99281 EMR DPT VST MAYX REQ PHY/QHP: CPT | Mod: ER

## 2024-06-26 RX ORDER — QUETIAPINE FUMARATE 100 MG/1
100 TABLET, FILM COATED ORAL DAILY
Qty: 30 TABLET | Refills: 0 | Status: SHIPPED | OUTPATIENT
Start: 2024-06-26 | End: 2025-06-26

## 2024-06-26 NOTE — ED PROVIDER NOTES
Encounter Date: 6/26/2024       History     Chief Complaint   Patient presents with    Medication Refill     Pt presents with request for refill of Seroquel. Pt reports he has been out X 3 months. Pt reports he moved X 1 1/2 years ago and has not established care with a PCP.      38 year old male with psychiatric history per chart review presents to ED requesting refill of his home Seroquel 100 mg that he takes daily.  States he recently moved and is having difficulty establishing new provider for his refills.  He reports he otherwise feels well at this time.  No other acute complaints.    The history is provided by the patient.     Review of patient's allergies indicates:  No Known Allergies  Past Medical History:   Diagnosis Date    Bipolar disorder     History of psychiatric hospitalization     Hx of psychiatric care     Psychiatric exam requested by authority     Psychiatric problem     Psychosis     Schizoaffective disorder     Seizures     Substance abuse     Therapy      History reviewed. No pertinent surgical history.  No family history on file.  Social History     Tobacco Use    Smoking status: Former     Types: Cigarettes    Smokeless tobacco: Never   Substance Use Topics    Alcohol use: No    Drug use: Not Currently     Types: Marijuana     Comment: MOJO, other drugs unknown     Review of Systems   Cardiovascular:  Negative for chest pain.   Gastrointestinal:  Negative for abdominal pain.   Psychiatric/Behavioral:  Negative for behavioral problems, self-injury and suicidal ideas.        Physical Exam     Initial Vitals [06/26/24 1730]   BP Pulse Resp Temp SpO2   (!) 153/103 69 18 98.7 °F (37.1 °C) 98 %      MAP       --         Physical Exam    Vitals reviewed.  Constitutional: Vital signs are normal. He appears well-developed and well-nourished. He is cooperative. He does not have a sickly appearance. He does not appear ill. No distress.   HENT:   Head: Normocephalic and atraumatic.   Eyes: EOM are  normal.   Neck:   Normal range of motion.  Musculoskeletal:      Cervical back: Normal range of motion.     Neurological: He is alert and oriented to person, place, and time. GCS eye subscore is 4. GCS verbal subscore is 5. GCS motor subscore is 6.   Psychiatric: He has a normal mood and affect. His speech is normal and behavior is normal.         ED Course   Procedures  Labs Reviewed - No data to display       Imaging Results    None          Medications - No data to display  Medical Decision Making  Patient presents requesting refill of his home Seroquel.  Denying any current symptoms or complaints.  Afebrile.  Patient overall well-appearing on exam.    DDx:  Including but not limited to medication refill    Patient will be given 30 day refill of his home Seroquel 100 mg.  Encouraged close outpatient follow-up to establish care for his future refills.  ED return precautions were discussed.  Patient states his understanding and agrees with plan.                                      Clinical Impression:  Final diagnoses:  [Z76.0] Medication refill (Primary)          ED Disposition Condition    Discharge Stable          ED Prescriptions       Medication Sig Dispense Start Date End Date Auth. Provider    QUEtiapine (SEROQUEL) 100 MG Tab Take 1 tablet (100 mg total) by mouth once daily. 30 tablet 6/26/2024 6/26/2025 Pawel Augustine PA-C          Follow-up Information       Follow up With Specialties Details Why Contact Info    Your Doctor                 Pawel Augustine PA-C  06/26/24 7826

## 2024-06-26 NOTE — DISCHARGE INSTRUCTIONS

## 2024-09-10 ENCOUNTER — HOSPITAL ENCOUNTER (EMERGENCY)
Facility: HOSPITAL | Age: 39
Discharge: HOME | End: 2024-09-10
Attending: EMERGENCY MEDICINE
Payer: MEDICARE

## 2024-09-10 VITALS
DIASTOLIC BLOOD PRESSURE: 108 MMHG | HEART RATE: 69 BPM | OXYGEN SATURATION: 98 % | SYSTOLIC BLOOD PRESSURE: 176 MMHG | RESPIRATION RATE: 18 BRPM | TEMPERATURE: 99 F | BODY MASS INDEX: 25.06 KG/M2 | HEIGHT: 72 IN | WEIGHT: 185 LBS

## 2024-09-10 DIAGNOSIS — Z76.0 ENCOUNTER FOR MEDICATION REFILL: Primary | ICD-10-CM

## 2024-09-10 DIAGNOSIS — F25.9 SCHIZOAFFECTIVE DISORDER, UNSPECIFIED TYPE: ICD-10-CM

## 2024-09-10 PROCEDURE — 99281 EMR DPT VST MAYX REQ PHY/QHP: CPT | Mod: ER

## 2024-09-10 RX ORDER — QUETIAPINE FUMARATE 100 MG/1
100 TABLET, FILM COATED ORAL DAILY
Qty: 30 TABLET | Refills: 0 | Status: SHIPPED | OUTPATIENT
Start: 2024-09-10 | End: 2024-10-10
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
## 2024-10-20 ENCOUNTER — HOSPITAL ENCOUNTER (EMERGENCY)
Facility: HOSPITAL | Age: 39
Discharge: HOME | End: 2024-10-20
Attending: EMERGENCY MEDICINE
Payer: MEDICARE

## 2024-10-20 VITALS
DIASTOLIC BLOOD PRESSURE: 65 MMHG | RESPIRATION RATE: 18 BRPM | SYSTOLIC BLOOD PRESSURE: 155 MMHG | HEART RATE: 80 BPM | OXYGEN SATURATION: 99 % | TEMPERATURE: 98 F

## 2024-10-20 DIAGNOSIS — Z76.0 MEDICATION REFILL: Primary | ICD-10-CM

## 2024-10-20 PROCEDURE — 99281 EMR DPT VST MAYX REQ PHY/QHP: CPT

## 2024-10-20 RX ORDER — QUETIAPINE FUMARATE 100 MG/1
100 TABLET, FILM COATED ORAL DAILY
Qty: 30 TABLET | Refills: 2 | Status: SHIPPED | OUTPATIENT
Start: 2024-10-20

## 2024-10-20 NOTE — DISCHARGE INSTRUCTIONS
Establish care with a primary care provider.  Continue taking your medications as directed.  He may return to the ER as needed.

## 2024-10-20 NOTE — ED PROVIDER NOTES
Encounter Date: 10/20/2024       History     Chief Complaint   Patient presents with    Medication Refill     Pt moved to [place] and doesn't have a pcp, he needs his Seroquel refilled.      38-year-old male presents emergency department for medication refill.  Patient states that he is not having any symptoms right now he is simply out of his Seroquel.  Recently moved to [place] and has not yet established care with a primary care provider.  No allergies.  Medical history of bipolar disorder, schizoaffective disorder, seizures, substance abuse.    The history is provided by the patient. No  was used.     Review of patient's allergies indicates:  No Known Allergies  Past Medical History:   Diagnosis Date    Bipolar disorder     History of psychiatric hospitalization     Hx of psychiatric care     Psychiatric exam requested by authority     Psychiatric problem     Psychosis     Schizoaffective disorder     Seizures     Substance abuse     Therapy      History reviewed. No pertinent surgical history.  No family history on file.  Social History     Tobacco Use    Smoking status: Former     Types: Cigarettes    Smokeless tobacco: Never   Substance Use Topics    Alcohol use: No    Drug use: Not Currently     Types: Marijuana     Comment: MOJO, other drugs unknown     Review of Systems   Constitutional:  Negative for chills and fever.   HENT:  Negative for rhinorrhea and sore throat.    Respiratory:  Negative for cough, shortness of breath and wheezing.    Cardiovascular:  Negative for chest pain.   Gastrointestinal:  Negative for abdominal pain, diarrhea, nausea and vomiting.   Genitourinary:  Negative for difficulty urinating.   Musculoskeletal:  Negative for back pain and neck pain.   Skin:  Negative for rash.   Neurological:  Negative for dizziness, weakness and headaches.   All other systems reviewed and are negative.      Physical Exam     Initial Vitals [10/20/24 0953]   BP Pulse Resp Temp  SpO2   (!) 160/78 83 17 98.1 °F (36.7 °C) 99 %      MAP       --         Physical Exam    Nursing note and vitals reviewed.  Constitutional: He appears well-developed and well-nourished. He is not diaphoretic. No distress.   HENT:   Head: Normocephalic and atraumatic.   Right Ear: External ear normal.   Left Ear: External ear normal.   Nose: Nose normal.   Eyes: Conjunctivae and EOM are normal. Right eye exhibits no discharge. Left eye exhibits no discharge.   Neck: Neck supple.   Normal range of motion.  Cardiovascular:  Normal rate, regular rhythm, normal heart sounds and intact distal pulses.           Pulmonary/Chest: Breath sounds normal. No respiratory distress.   Abdominal: Abdomen is soft. Bowel sounds are normal. He exhibits no distension. There is no abdominal tenderness.   Musculoskeletal:         General: No tenderness or edema. Normal range of motion.      Cervical back: Normal range of motion and neck supple.     Neurological: He is alert and oriented to person, place, and time. He has normal strength. No sensory deficit. GCS score is 15. GCS eye subscore is 4. GCS verbal subscore is 5. GCS motor subscore is 6.   Skin: Skin is warm and dry. No rash noted.   Psychiatric: He has a normal mood and affect. Thought content normal.         ED Course   Procedures  Labs Reviewed - No data to display       Imaging Results    None          Medications - No data to display  Medical Decision Making  Patient has no symptoms or findings on exam.  I have sent in a refill for the patient's Seroquel.  I have encouraged him to follow-up with primary care and to return as needed.                                      Clinical Impression:  Final diagnoses:  [Z76.0] Medication refill (Primary)          ED Disposition Condition    Discharge Stable          ED Prescriptions       Medication Sig Dispense Start Date End Date Auth. Provider    QUEtiapine (SEROQUEL) 100 MG Tab Take 1 tablet (100 mg total) by mouth once daily. 30  tablet 10/20/2024 -- Jones Diaz III, NP          Follow-up Information       Follow up With Specialties Details Why Contact Info Additional Information    [facility] - Emergency Dept Emergency Medicine Go to  As needed [address]  [facility] [address]  [phone] 1st floor             Jones Diaz III, NP  10/20/24 1010

## 2025-01-25 ENCOUNTER — HOSPITAL ENCOUNTER (EMERGENCY)
Facility: HOSPITAL | Age: 40
Discharge: HOME | End: 2025-01-25
Attending: EMERGENCY MEDICINE
Payer: MEDICARE

## 2025-01-25 VITALS
RESPIRATION RATE: 18 BRPM | OXYGEN SATURATION: 99 % | DIASTOLIC BLOOD PRESSURE: 70 MMHG | HEIGHT: 72 IN | WEIGHT: 185 LBS | TEMPERATURE: 98 F | SYSTOLIC BLOOD PRESSURE: 145 MMHG | HEART RATE: 75 BPM | BODY MASS INDEX: 25.06 KG/M2

## 2025-01-25 DIAGNOSIS — Z76.0 ENCOUNTER FOR MEDICATION REFILL: Primary | ICD-10-CM

## 2025-01-25 PROCEDURE — 99281 EMR DPT VST MAYX REQ PHY/QHP: CPT

## 2025-01-25 RX ORDER — QUETIAPINE FUMARATE 100 MG/1
100 TABLET, FILM COATED ORAL DAILY
Qty: 15 TABLET | Refills: 0 | Status: SHIPPED | OUTPATIENT
Start: 2025-01-25 | End: 2025-02-09

## 2025-01-25 NOTE — DISCHARGE INSTRUCTIONS
Take medications as directed  You must see a primary care provider at a [facility] for any further medication refills  
No

## 2025-01-25 NOTE — ED PROVIDER NOTES
Encounter Date: 1/25/2025       History     Chief Complaint   Patient presents with    Medication Refill     Pt states he just recently moved here and doesn't have a pcp yet and needs his seroquel because he hasn't been sleeping.      39-year-old male with a past medical history of bipolar, seizures, schizoaffective disorder, previous substance abuse presents emergency department requesting a refill of Seroquel.  Patient has no current complaints, he states he is just out of his medication.    The history is provided by the patient.     Review of patient's allergies indicates:  No Known Allergies  Past Medical History:   Diagnosis Date    Bipolar disorder     History of psychiatric hospitalization     Hx of psychiatric care     Psychiatric exam requested by authority     Psychiatric problem     Psychosis     Schizoaffective disorder     Seizures     Substance abuse     Therapy      History reviewed. No pertinent surgical history.  No family history on file.  Social History     Tobacco Use    Smoking status: Former     Types: Cigarettes    Smokeless tobacco: Never   Substance Use Topics    Alcohol use: No    Drug use: Not Currently     Types: Marijuana     Comment: MOJO, other drugs unknown     Review of Systems   Constitutional: Negative.    HENT: Negative.     Respiratory: Negative.     Cardiovascular: Negative.    Genitourinary: Negative.    Musculoskeletal: Negative.    Neurological: Negative.    Hematological: Negative.    Psychiatric/Behavioral: Negative.     All other systems reviewed and are negative.      Physical Exam     Initial Vitals [01/25/25 1524]   BP Pulse Resp Temp SpO2   (!) 144/72 80 17 98.9 °F (37.2 °C) 98 %      MAP       --         Physical Exam    Nursing note and vitals reviewed.  Constitutional: He appears well-developed and well-nourished.   HENT:   Head: Normocephalic and atraumatic.   Eyes: Conjunctivae and EOM are normal. Pupils are equal, round, and reactive to light.   Cardiovascular:   Normal rate.             Neurological: He is alert and oriented to person, place, and time. He has normal strength. GCS score is 15. GCS eye subscore is 4. GCS verbal subscore is 5. GCS motor subscore is 6.   Skin: Skin is warm.   Psychiatric: He has a normal mood and affect.         ED Course   Procedures  Labs Reviewed - No data to display       Imaging Results    None          Medications - No data to display  Medical Decision Making  39-year-old male with a past medical history of bipolar, seizures, schizoaffective disorder, previous substance abuse presents emergency department requesting a refill of Seroquel.  Patient has no current complaints, he states he is just out of his medication.    The history is provided by the patient.     Considerations include but not limited to, medication refill    39-year-old male presents emergency department with no complaints he is here just requesting that his Seroquel be refill to help him sleep.  On review the patient's chart the last tendon counters the patient has had in the emergency department around the [place] and [place] surrounding areas had been for Seroquel refills.  The patient informed me that he recently moved here however he was seen here in October and had a three-month of Seroquel prescription written for him.  I instructed the patient that he needs to find a primary care provider to write him for any further refills.  The patient will be given 15 tablets and referred to [facility]    Risk  Prescription drug management.                                      Clinical Impression:  Final diagnoses:  [Z76.0] Encounter for medication refill (Primary)          ED Disposition Condition    Discharge Stable          ED Prescriptions       Medication Sig Dispense Start Date End Date Auth. Provider    QUEtiapine (SEROQUEL) 100 MG Tab Take 1 tablet (100 mg total) by mouth once daily. for 15 doses 15 tablet 1/25/2025 2/9/2025 Elizabeth Iyer FNP           Follow-up Information       Follow up With Specialties Details Why Contact Info    [facility]  Schedule an appointment as soon as possible for a visit in 2 days  [address] [place] [address]  [phone]               Elizabeth Iyer, JUAN  01/25/25 1424

## 2025-04-01 ENCOUNTER — HOSPITAL ENCOUNTER (EMERGENCY)
Facility: HOSPITAL | Age: 40
Discharge: HOME | End: 2025-04-01
Attending: EMERGENCY MEDICINE
Payer: MEDICARE

## 2025-04-01 VITALS
RESPIRATION RATE: 16 BRPM | SYSTOLIC BLOOD PRESSURE: 156 MMHG | TEMPERATURE: 98 F | HEART RATE: 63 BPM | WEIGHT: 190 LBS | DIASTOLIC BLOOD PRESSURE: 99 MMHG | OXYGEN SATURATION: 100 % | HEIGHT: 72 IN | BODY MASS INDEX: 25.73 KG/M2

## 2025-04-01 DIAGNOSIS — Z76.0 MEDICATION REFILL: Primary | ICD-10-CM

## 2025-04-01 PROCEDURE — 99281 EMR DPT VST MAYX REQ PHY/QHP: CPT | Mod: ER

## 2025-04-01 RX ORDER — QUETIAPINE FUMARATE 100 MG/1
100 TABLET, FILM COATED ORAL DAILY
Qty: 30 TABLET | Refills: 0 | Status: SHIPPED | OUTPATIENT
Start: 2025-04-01 | End: 2025-05-01

## 2025-04-02 NOTE — ED PROVIDER NOTES
Encounter Date: 4/1/2025       History     Chief Complaint   Patient presents with    Medication Refill     Pt is asking for a refill on his Seroquel 100mg.      HPI  39 y.o.  Here for seroquel refill  No active psych issues  Has not established with PCP yet    Review of patient's allergies indicates:  No Known Allergies  Past Medical History:   Diagnosis Date    Bipolar disorder     History of psychiatric hospitalization     Hx of psychiatric care     Psychiatric exam requested by authority     Psychiatric problem     Psychosis     Schizoaffective disorder     Seizures     Substance abuse     Therapy      History reviewed. No pertinent surgical history.  No family history on file.  Social History[1]  Review of Systems  All systems were reviewed/examined and were negative except as noted in the HPI.    Physical Exam     Initial Vitals [04/01/25 2014]   BP Pulse Resp Temp SpO2   (!) 156/99 63 16 98.1 °F (36.7 °C) 100 %      MAP       --         Physical Exam    General: the patient is awake, alert, and in no apparent distress.  Head: normocephalic and atraumatic, sclera are clear  Neck: supple without meningismus  Chest: no respiratory distress  Heart: regular rate and rhythm  Extremities: warm and well perfused  Skin: warm and dry  Psych conversant  Neuro: awake, alert, moving all extremities    ED Course   Procedures  Labs Reviewed - No data to display       Imaging Results    None          Medications - No data to display  Medical Decision Making  Risk  Prescription drug management.       Medical Decision Making:    This is an emergent evaluation of a patient presenting to the ED.  Nursing notes were reviewed.    I decided to obtain and review old medical records, which showed: ED visits for same    Evaluation for Emergency Medical Condition  The patient received a medical screening exam and within a reasonable degree of clinical confidence an emergency medical condition has not been identified.  The patient is  instructed on proper follow up and return precautions to the ED.    Encouraged estb with PCP      Jadiel Rossi MD, MARGOTH                                 Clinical Impression:  Final diagnoses:  [Z76.0] Medication refill (Primary)          ED Disposition Condition    Discharge Stable          ED Prescriptions       Medication Sig Dispense Start Date End Date Auth. Provider    QUEtiapine (SEROQUEL) 100 MG Tab Take 1 tablet (100 mg total) by mouth once daily. for 30 doses 30 tablet 4/1/2025 5/1/2025 Lyle Rossi MD          Follow-up Information       Follow up With Specialties Details Why Contact Info Additional Information    [facility] Family Medicine Family Medicine Schedule an appointment as soon as possible for a visit   [address]  [address]  [facility] [address] [phone] Please park in [address] or [address] and use [address]. Take Medical Office Bldg. elevators.          Discharged to home in stable condition, return to ED warnings given, follow up and patient care instructions given.      Jadiel Rossi MD, MARGOTH, [facility]  Department of Emergency Medicine         [1]   Social History  Tobacco Use    Smoking status: Former     Types: Cigarettes    Smokeless tobacco: Never   Substance Use Topics    Alcohol use: No    Drug use: Not Currently     Types: Marijuana     Comment: MOJO, other drugs unknown        Lyle Rossi MD  04/02/25 9602

## 2025-07-19 ENCOUNTER — HOSPITAL ENCOUNTER (EMERGENCY)
Facility: HOSPITAL | Age: 40
Discharge: HOME | End: 2025-07-19
Attending: EMERGENCY MEDICINE
Payer: MEDICARE

## 2025-07-19 VITALS
WEIGHT: 172.94 LBS | OXYGEN SATURATION: 98 % | BODY MASS INDEX: 23.42 KG/M2 | TEMPERATURE: 98 F | DIASTOLIC BLOOD PRESSURE: 74 MMHG | HEIGHT: 72 IN | SYSTOLIC BLOOD PRESSURE: 126 MMHG | HEART RATE: 72 BPM | RESPIRATION RATE: 16 BRPM

## 2025-07-19 DIAGNOSIS — Z76.0 MEDICATION REFILL: Primary | ICD-10-CM

## 2025-07-19 PROCEDURE — 99281 EMR DPT VST MAYX REQ PHY/QHP: CPT | Mod: ER

## 2025-07-19 RX ORDER — QUETIAPINE FUMARATE 100 MG/1
100 TABLET, FILM COATED ORAL DAILY
Qty: 30 TABLET | Refills: 0 | Status: SHIPPED | OUTPATIENT
Start: 2025-07-19 | End: 2025-08-18

## 2025-07-19 NOTE — ED PROVIDER NOTES
Encounter Date: 7/19/2025       History     Chief Complaint   Patient presents with    Medication Refill     Pt has been out of his Seroquel for several months and would like a refill.      HPI  39 y.o.   Here for seroquel refill   Voices no complaints    Review of patient's allergies indicates:  No Known Allergies  Past Medical History:   Diagnosis Date    Bipolar disorder     History of psychiatric hospitalization     Hx of psychiatric care     Psychiatric exam requested by authority     Psychiatric problem     Psychosis     Schizoaffective disorder     Seizures     Substance abuse     Therapy      History reviewed. No pertinent surgical history.  No family history on file.  Social History[1]  Review of Systems  All systems were reviewed/examined and were negative except as noted in the HPI.    Physical Exam     Initial Vitals [07/19/25 1724]   BP Pulse Resp Temp SpO2   126/74 72 16 98.1 °F (36.7 °C) 98 %      MAP       --         Physical Exam    General: the patient is awake, alert, and in no apparent distress.  Head: normocephalic and atraumatic, sclera are clear  Neck: supple without meningismus  Chest:, no respiratory distress  Heart: regular rate and rhythm  Extremities: warm and well perfused  Skin: warm and dry  Psych conversant  Neuro: awake, alert, moving all extremities    ED Course   Procedures  Labs Reviewed - No data to display       Imaging Results    None          Medications - No data to display  Medical Decision Making  Risk  Prescription drug management.       Medical Decision Making:    This is an emergent evaluation of a patient presenting to the ED.  Nursing notes were reviewed.    I decided to obtain and review old medical records, which showed: ED visits    Evaluation for Emergency Medical Condition  The patient received a medical screening exam and within a reasonable degree of clinical confidence an emergency medical condition has not been identified.  The patient is instructed on proper  follow up and return precautions to the ED.      Jadiel Rossi MD, MARGOTH                                     Clinical Impression:  Final diagnoses:  [Z76.0] Medication refill (Primary)          ED Disposition Condition    Discharge Stable          ED Prescriptions       Medication Sig Dispense Start Date End Date Auth. Provider    QUEtiapine (SEROQUEL) 100 MG Tab Take 1 tablet (100 mg total) by mouth once daily. for 30 doses 30 tablet 7/19/2025 8/18/2025 Lyle Rossi MD          Follow-up Information       Follow up With Specialties Details Why Contact Info Additional Information    [facility] Family Medicine Family Medicine Schedule an appointment as soon as possible for a visit   [address]  [address]  [facility] [address] [phone] Please park in [address] or [address] and use [address]. Take Medical Office Bldg. elevators.          Discharged to home in stable condition, return to ED warnings given, follow up and patient care instructions given.      Jadiel Rossi MD, MARGOTH, [facility]  Department of Emergency Medicine           [1]   Social History  Tobacco Use    Smoking status: Former     Types: Cigarettes    Smokeless tobacco: Never   Substance Use Topics    Alcohol use: No    Drug use: Not Currently     Types: Marijuana     Comment: MOJO, other drugs unknown        Lyle Rossi MD  07/21/25 1091